# Patient Record
Sex: MALE | Race: OTHER | HISPANIC OR LATINO | URBAN - METROPOLITAN AREA
[De-identification: names, ages, dates, MRNs, and addresses within clinical notes are randomized per-mention and may not be internally consistent; named-entity substitution may affect disease eponyms.]

---

## 2018-06-22 ENCOUNTER — EMERGENCY (EMERGENCY)
Facility: HOSPITAL | Age: 48
LOS: 1 days | Discharge: OTHER INSTITUTION | End: 2018-06-22
Attending: EMERGENCY MEDICINE
Payer: SELF-PAY

## 2018-06-22 VITALS
DIASTOLIC BLOOD PRESSURE: 94 MMHG | OXYGEN SATURATION: 99 % | TEMPERATURE: 99 F | WEIGHT: 175.05 LBS | HEART RATE: 89 BPM | SYSTOLIC BLOOD PRESSURE: 171 MMHG | RESPIRATION RATE: 16 BRPM

## 2018-06-22 VITALS
DIASTOLIC BLOOD PRESSURE: 90 MMHG | HEART RATE: 97 BPM | RESPIRATION RATE: 18 BRPM | OXYGEN SATURATION: 98 % | SYSTOLIC BLOOD PRESSURE: 146 MMHG | TEMPERATURE: 98 F

## 2018-06-22 DIAGNOSIS — Z98.89 OTHER SPECIFIED POSTPROCEDURAL STATES: Chronic | ICD-10-CM

## 2018-06-22 DIAGNOSIS — F11.10 OPIOID ABUSE, UNCOMPLICATED: ICD-10-CM

## 2018-06-22 DIAGNOSIS — F14.10 COCAINE ABUSE, UNCOMPLICATED: ICD-10-CM

## 2018-06-22 DIAGNOSIS — F32.9 MAJOR DEPRESSIVE DISORDER, SINGLE EPISODE, UNSPECIFIED: ICD-10-CM

## 2018-06-22 DIAGNOSIS — F10.10 ALCOHOL ABUSE, UNCOMPLICATED: ICD-10-CM

## 2018-06-22 DIAGNOSIS — R69 ILLNESS, UNSPECIFIED: ICD-10-CM

## 2018-06-22 LAB
AMPHET UR-MCNC: NEGATIVE — SIGNIFICANT CHANGE UP
ANION GAP SERPL CALC-SCNC: 19 MMOL/L — HIGH (ref 5–17)
APAP SERPL-MCNC: <7.5 UG/ML — LOW (ref 10–26)
APPEARANCE UR: CLEAR — SIGNIFICANT CHANGE UP
BARBITURATES UR SCN-MCNC: NEGATIVE — SIGNIFICANT CHANGE UP
BASOPHILS # BLD AUTO: 0 K/UL — SIGNIFICANT CHANGE UP (ref 0–0.2)
BASOPHILS NFR BLD AUTO: 0.7 % — SIGNIFICANT CHANGE UP (ref 0–2)
BENZODIAZ UR-MCNC: NEGATIVE — SIGNIFICANT CHANGE UP
BILIRUB UR-MCNC: NEGATIVE — SIGNIFICANT CHANGE UP
BUN SERPL-MCNC: 7 MG/DL — LOW (ref 8–20)
CALCIUM SERPL-MCNC: 9.6 MG/DL — SIGNIFICANT CHANGE UP (ref 8.6–10.2)
CHLORIDE SERPL-SCNC: 100 MMOL/L — SIGNIFICANT CHANGE UP (ref 98–107)
CO2 SERPL-SCNC: 18 MMOL/L — LOW (ref 22–29)
COCAINE METAB.OTHER UR-MCNC: NEGATIVE — SIGNIFICANT CHANGE UP
COLOR SPEC: YELLOW — SIGNIFICANT CHANGE UP
CREAT SERPL-MCNC: 0.66 MG/DL — SIGNIFICANT CHANGE UP (ref 0.5–1.3)
DIFF PNL FLD: ABNORMAL
EOSINOPHIL # BLD AUTO: 0.1 K/UL — SIGNIFICANT CHANGE UP (ref 0–0.5)
EOSINOPHIL NFR BLD AUTO: 0.8 % — SIGNIFICANT CHANGE UP (ref 0–5)
ETHANOL SERPL-MCNC: 51 MG/DL — SIGNIFICANT CHANGE UP
GLUCOSE SERPL-MCNC: 103 MG/DL — SIGNIFICANT CHANGE UP (ref 70–115)
GLUCOSE UR QL: NEGATIVE MG/DL — SIGNIFICANT CHANGE UP
HCT VFR BLD CALC: 45.4 % — SIGNIFICANT CHANGE UP (ref 42–52)
HGB BLD-MCNC: 15.6 G/DL — SIGNIFICANT CHANGE UP (ref 14–18)
KETONES UR-MCNC: NEGATIVE — SIGNIFICANT CHANGE UP
LEUKOCYTE ESTERASE UR-ACNC: ABNORMAL
LYMPHOCYTES # BLD AUTO: 1.7 K/UL — SIGNIFICANT CHANGE UP (ref 1–4.8)
LYMPHOCYTES # BLD AUTO: 22.4 % — SIGNIFICANT CHANGE UP (ref 20–55)
MCHC RBC-ENTMCNC: 30.9 PG — SIGNIFICANT CHANGE UP (ref 27–31)
MCHC RBC-ENTMCNC: 34.4 G/DL — SIGNIFICANT CHANGE UP (ref 32–36)
MCV RBC AUTO: 89.9 FL — SIGNIFICANT CHANGE UP (ref 80–94)
METHADONE UR-MCNC: NEGATIVE — SIGNIFICANT CHANGE UP
MONOCYTES # BLD AUTO: 0.5 K/UL — SIGNIFICANT CHANGE UP (ref 0–0.8)
MONOCYTES NFR BLD AUTO: 6.4 % — SIGNIFICANT CHANGE UP (ref 3–10)
NEUTROPHILS # BLD AUTO: 5.3 K/UL — SIGNIFICANT CHANGE UP (ref 1.8–8)
NEUTROPHILS NFR BLD AUTO: 69.3 % — SIGNIFICANT CHANGE UP (ref 37–73)
NITRITE UR-MCNC: NEGATIVE — SIGNIFICANT CHANGE UP
OPIATES UR-MCNC: NEGATIVE — SIGNIFICANT CHANGE UP
PCP SPEC-MCNC: SIGNIFICANT CHANGE UP
PCP UR-MCNC: NEGATIVE — SIGNIFICANT CHANGE UP
PH UR: 6.5 — SIGNIFICANT CHANGE UP (ref 5–8)
PLATELET # BLD AUTO: 208 K/UL — SIGNIFICANT CHANGE UP (ref 150–400)
POTASSIUM SERPL-MCNC: 4 MMOL/L — SIGNIFICANT CHANGE UP (ref 3.5–5.3)
POTASSIUM SERPL-SCNC: 4 MMOL/L — SIGNIFICANT CHANGE UP (ref 3.5–5.3)
PROT UR-MCNC: NEGATIVE MG/DL — SIGNIFICANT CHANGE UP
RBC # BLD: 5.05 M/UL — SIGNIFICANT CHANGE UP (ref 4.6–6.2)
RBC # FLD: 12.6 % — SIGNIFICANT CHANGE UP (ref 11–15.6)
SALICYLATES SERPL-MCNC: <0.6 MG/DL — LOW (ref 10–20)
SODIUM SERPL-SCNC: 137 MMOL/L — SIGNIFICANT CHANGE UP (ref 135–145)
SP GR SPEC: 1 — LOW (ref 1.01–1.02)
THC UR QL: POSITIVE
UROBILINOGEN FLD QL: NEGATIVE MG/DL — SIGNIFICANT CHANGE UP
WBC # BLD: 7.7 K/UL — SIGNIFICANT CHANGE UP (ref 4.8–10.8)
WBC # FLD AUTO: 7.7 K/UL — SIGNIFICANT CHANGE UP (ref 4.8–10.8)

## 2018-06-22 PROCEDURE — 80307 DRUG TEST PRSMV CHEM ANLYZR: CPT

## 2018-06-22 PROCEDURE — 99284 EMERGENCY DEPT VISIT MOD MDM: CPT

## 2018-06-22 PROCEDURE — 80048 BASIC METABOLIC PNL TOTAL CA: CPT

## 2018-06-22 PROCEDURE — 36415 COLL VENOUS BLD VENIPUNCTURE: CPT

## 2018-06-22 PROCEDURE — 93005 ELECTROCARDIOGRAM TRACING: CPT

## 2018-06-22 PROCEDURE — 99285 EMERGENCY DEPT VISIT HI MDM: CPT

## 2018-06-22 PROCEDURE — 93010 ELECTROCARDIOGRAM REPORT: CPT

## 2018-06-22 PROCEDURE — 81001 URINALYSIS AUTO W/SCOPE: CPT

## 2018-06-22 PROCEDURE — 85027 COMPLETE CBC AUTOMATED: CPT

## 2018-06-22 RX ORDER — DOLUTEGRAVIR SODIUM 25 MG/1
1 TABLET, FILM COATED ORAL
Qty: 0 | Refills: 0 | COMMUNITY

## 2018-06-22 RX ORDER — EMTRICITABINE AND TENOFOVIR DISOPROXIL FUMARATE 200; 300 MG/1; MG/1
1 TABLET, FILM COATED ORAL
Qty: 0 | Refills: 0 | COMMUNITY

## 2018-06-22 RX ORDER — THIAMINE MONONITRATE (VIT B1) 100 MG
100 TABLET ORAL ONCE
Qty: 0 | Refills: 0 | Status: COMPLETED | OUTPATIENT
Start: 2018-06-22 | End: 2018-06-22

## 2018-06-22 RX ORDER — ESCITALOPRAM OXALATE 10 MG/1
1 TABLET, FILM COATED ORAL
Qty: 0 | Refills: 0 | COMMUNITY

## 2018-06-22 RX ORDER — MULTIVIT-MIN/FERROUS GLUCONATE 9 MG/15 ML
1 LIQUID (ML) ORAL
Qty: 0 | Refills: 0 | COMMUNITY

## 2018-06-22 RX ORDER — DARUNAVIR ETHANOLATE AND COBICISTAT 800; 150 MG/1; MG/1
1 TABLET, FILM COATED ORAL
Qty: 0 | Refills: 0 | COMMUNITY

## 2018-06-22 RX ORDER — BUPRENORPHINE AND NALOXONE 2; .5 MG/1; MG/1
0 TABLET SUBLINGUAL
Qty: 0 | Refills: 0 | COMMUNITY

## 2018-06-22 RX ADMIN — Medication 2 MILLIGRAM(S): at 09:23

## 2018-06-22 RX ADMIN — Medication 100 MILLIGRAM(S): at 12:53

## 2018-06-22 RX ADMIN — Medication 1 MILLIGRAM(S): at 12:33

## 2018-06-22 NOTE — ED PROVIDER NOTE - PMH
Alcohol abuse    Heroin abuse    Knee pain, right    Lumbar pain  Pt states that he is having an AXEL on 7/30  Nose pain    Rotator cuff tear, right

## 2018-06-22 NOTE — ED BEHAVIORAL HEALTH NOTE - BEHAVIORAL HEALTH NOTE
SW Note: SBIRT met with pt and a referral to [facility] was made for detox tx. Dr. Siddiqui completed a peer to peer review with RAMONE DAMICO, Dr. Muller. Pt accepted for admission.  RN Wood Martínez called and completed RN hand off. Ambulance being arranged.

## 2018-06-22 NOTE — ED PROVIDER NOTE - OBJECTIVE STATEMENT
47 y/o M with PMHx of EtOH abuse and heroin abuse presents to ED c/o depression and insomnia onset today. Patient states he recently stopped using heroin, was supposed to be taking Suboxone but has not follow-up with PCP to get it. Notes he has been self-medicating with alcohol, last drink this am (1 beer), and attempted to use a Narcan kit he had at home to see if that would improve his symptoms. Denies any recent drug use except for marijuana, abdominal pain, nausea, vomiting, SI or HI. No further acute complaints at this time.

## 2018-06-22 NOTE — ED ADULT NURSE NOTE - CHPI ED SYMPTOMS NEG
no agitation/no confusion/no homicidal/no change in level of consciousness/no hallucinations/no weakness/no weight loss/no paranoia/no disorientation

## 2018-06-22 NOTE — ED ADULT TRIAGE NOTE - CHIEF COMPLAINT QUOTE
Pt BIBA ambulatory in ED c/o alcohol intoxication, reports he drinks a lot because he feels depressed. Pt denies any SI or HI. Reports hx of opiate abuse. Denies any drug use today but states " I took Narcan today, I don't know what I was thinking."

## 2018-06-22 NOTE — ED BEHAVIORAL HEALTH ASSESSMENT NOTE - DESCRIPTION
uneventful   ICU Vital Signs Last 24 Hrs  T(C): 37 (22 Jun 2018 08:55), Max: 37 (22 Jun 2018 08:55)  T(F): 98.6 (22 Jun 2018 08:55), Max: 98.6 (22 Jun 2018 08:55)  HR: 89 (22 Jun 2018 08:55) (89 - 89)  BP: 171/94 (22 Jun 2018 08:55) (171/94 - 171/94)  BP(mean): --  ABP: --  ABP(mean): --  RR: 16 (22 Jun 2018 08:55) (16 - 16)  SpO2: 99% (22 Jun 2018 08:55) (99% - 99%) HIV infection patient lives with sister, unemployed uneventful   ICU Vital Signs Last 24 Hrs  T(C): 37 (22 Jun 2018 08:55), Max: 37 (22 Jun 2018 08:55)  T(F): 98.6 (22 Jun 2018 08:55), Max: 98.6 (22 Jun 2018 08:55)  HR: 89 (22 Jun 2018 08:55) (89 - 89)  BP: 171/94 (22 Jun 2018 08:55) (171/94 - 171/94)  RR: 16 (22 Jun 2018 08:55) (16 - 16)  SpO2: 99% (22 Jun 2018 08:55) (99% - 99%)

## 2018-06-22 NOTE — SBIRT NOTE. - NSSBIRTSERVICES_GEN_A_ED
Referral to Treatment Provided/Brief Intervention Performed/Full Screen ONLY/Smoking Cessation Information Provided

## 2018-06-22 NOTE — ED ADULT NURSE REASSESSMENT NOTE - NS ED NURSE REASSESS COMMENT FT1
Nurse to nurse report called and given to Kristie Rn at [facility].  Patient stable, returned to bed after eating 100% of his lunch.  Safety of patient maintained.
Patient was noted to have tremors in extremities and complaints of anxiety,  CIWA score was 8 at 1234.   Dr. Siddiqui notified and patient received Ativan 1mg PO.  Patient out of room for lunch and is currently eating.  No attempts to harm self or others and safety maintained.

## 2018-06-22 NOTE — SBIRT NOTE. - NSSBIRTSERVICES_GEN_A_ED_FT
Provided SBIRT services: Full screen positive. Referral to Treatment Performed. Screening results were reviewed with the patient and patient was provided information about healthy guidelines and potential negative consequences associated with level of risk. Motivation and readiness to reduce or stop use was discussed and goals and activities to make changes were suggested/offered.  Referral for complete assessment and level of care determination at a certified treatment facility was completed by contacting the treatment facility via phone, and add apt info as noted below:  Appt confirmed at [facility] - Dr. Conway to Dr. Muller - Transfer via [facility] - Nurse to nurse  [phone]  Audit Score: 32  DAST Score: 8  Duration = 20 Minutes

## 2018-06-22 NOTE — ED BEHAVIORAL HEALTH ASSESSMENT NOTE - SUMMARY
48  year old male with PMHx of EtOH abuse (12 beers a day), HIV infection on HAART, heroin abuse (last use was months ago) presents to ED c/o depression and insomnia which he had on and off for past couple of years whenever he stops drinking. The patient denies homicidal or suicidal ideation.  Patient has history of heroin use but not currently getting suboxone, and he is currently using alcohol but he is not a part of any abstinence program. Patient will need to be re-started on antidepressants. He was previously on esctialopram, unclear if he was compliant with his medications. 48  year old male with PMHx of EtOH abuse (12 beers a day), HIV infection on HAART, heroin abuse (last use was months ago) presents to ED c/o depression and insomnia which he had on and off for past couple of years whenever he stops drinking. The patient denies homicidal or suicidal ideation.  Patient has history of heroin use but not currently getting suboxone, and he is currently using alcohol but he is not a part of any abstinence program. Patient will need to be re-started on antidepressants. He was previously on esctialopram, unclear if he was compliant with his medications. Patient got 2 mg of ativan PO in the ED. Patient denies symptoms of alcohol withdrawal currently, except for anxiety, however he says that he had insomnia for "a while" and it's not acute.

## 2018-06-22 NOTE — ED ADULT NURSE NOTE - OBJECTIVE STATEMENT
Patient presented in  area after being medically cleared in main ED.  Patient dressed in yellow gowns, ambulating with steady gait and speech is clear with a low tone.  Patient endorses he has been anxious and depressed.  Patient admits to daily alcohol abuse approximately 10 beers a day which he has to drink to sleep.  Patient has a history of heroin abuse and was on Suboxone but has run out of it due to missing last three appointment with Dr. Humphrey.  Patient smokes marijuana daily for the past 30 years and 4 to 5 cigarettes a day.  No history of rehab, detox, or inpatient psychiatric hospitalizations.  Patient is reports he takes three medications for his HIV but unable to recall names of medication.  Medical history also includes being treated for a collapsed lung last year and a stroke in 2014 after which he lost hearing in his right ear.  Patient feels depressed and hopeless with passive suicidal ideations but denies plan or intent to harm himself related to his daily drinking.  Patient is currently living with his sister and is unemployed.  Denies psychotic symptoms but reports he does get nervous around people at times. Patient presented in  area after being medically cleared in main ED.  Patient dressed in yellow gowns, ambulating with steady gait and speech is clear with a low tone.  Patient endorses he has been anxious and depressed.  Patient admits to daily alcohol abuse approximately 10 beers a day which he has to drink to sleep.  Patient has a history of heroin abuse and was on Suboxone but has run out of it due to missing last three appointment with Dr. uHmphrey.  Patient smokes marijuana daily for the past 30 years and 4 to 5 cigarettes a day.  No history of rehab, detox, or inpatient psychiatric hospitalizations.  Patient is reports he takes three medications for his HIV but unable to recall names of medication.  Medical history also includes being treated for a collapsed lung last year and a stroke in 2014 after which he lost hearing in his right ear.  Patient feels depressed and hopeless with passive suicidal ideations but denies plan or intent to harm himself related to his daily drinking.  Patient is currently living with his sister and is unemployed.  Denies psychotic symptoms but reports he does get nervous around people at times.  Patient reports he self administered Narcan from his kit today in hopes it would help him sleep.

## 2018-06-22 NOTE — ED ADULT NURSE NOTE - CAS DISCH CONDITION
Stable/Pt transferred to [facility] for continued detox.  Alert and oriented.  Reports feeling better. Used restroom prior to leaving. Gait steady. No S/I , No H/I and no psychosis observed. Reports feeling tired. All belonging given to  Ambulance team. Left in stable condition

## 2018-06-22 NOTE — ED BEHAVIORAL HEALTH ASSESSMENT NOTE - DESCRIPTION (FIRST USE, LAST USE, QUANTITY, FREQUENCY, DURATION)
patient has been drinking alcohol for years, and his last drink was this morning (one beer) last weekend heroin use a "couple of months ago"

## 2018-06-22 NOTE — ED BEHAVIORAL HEALTH ASSESSMENT NOTE - CURRENT MEDICATION
escitalopram 10 mg oral tablet: 1 tab(s) orally once a day (22 Jun 2018 11:22)  Prezcobix 800 mg-150 mg oral tablet: 1 tab(s) orally once a day (22 Jun 2018 11:22)  Suboxone 8 mg-2 mg sublingual tablet: sublingual 3 times a day (22 Jun 2018 11:22)  Tivicay 50 mg oral tablet: 1 tab(s) orally once a day (22 Jun 2018 11:22)  Truvada 200 mg-300 mg oral tablet: 1 tab(s) orally once a day (22 Jun 2018 11:22)

## 2018-06-22 NOTE — ED BEHAVIORAL HEALTH ASSESSMENT NOTE - HPI (INCLUDE ILLNESS QUALITY, SEVERITY, DURATION, TIMING, CONTEXT, MODIFYING FACTORS, ASSOCIATED SIGNS AND SYMPTOMS)
48  year old male with PMHx of EtOH abuse (12 beers a day), HIV infection on HAART, heroin abuse (last use was months ago) presents to ED c/o depression and insomnia which he had on and off for the past "couple of years".  The patient states that he gets sad, lacks motivation, cries easily and is unable to sleep whenever he quits drinking. Patient states he recently stopped using heroin a "couple of months ago" , was supposed to be taking Suboxone but has not follow-up with PCP to get it. Notes he has been self-medicating with alcohol, last drink this am (1 beer), and attempted to use a Narcan kit he had at home to see if that would improve his symptoms. Patient states that he used cocaine last weekned. Patient denies symptoms of alcohol withdrawal such as headache, sweating, tremor, and says he has never had a withdrawal seizure, and states he has never been in the hospital for alcohol withdrawal. The patient has never been part of an inpatient or outpatient alcohol or heroin abstinence program.

## 2020-03-29 NOTE — ED BEHAVIORAL HEALTH ASSESSMENT NOTE - ACTIVATING EVENTS/STRESSORS
Answered call bell, pt requesting to have something to drink.  MD ok with this.  Pt updated on plan for repeat troponins.   Non-compliant with treatment

## 2022-03-28 ENCOUNTER — EMERGENCY (EMERGENCY)
Facility: HOSPITAL | Age: 52
LOS: 1 days | Discharge: HOME | End: 2022-03-28
Attending: EMERGENCY MEDICINE
Payer: COMMERCIAL

## 2022-03-28 VITALS
OXYGEN SATURATION: 98 % | TEMPERATURE: 99 F | HEART RATE: 71 BPM | WEIGHT: 154.98 LBS | RESPIRATION RATE: 20 BRPM | DIASTOLIC BLOOD PRESSURE: 100 MMHG | HEIGHT: 70 IN | SYSTOLIC BLOOD PRESSURE: 162 MMHG

## 2022-03-28 VITALS
HEART RATE: 72 BPM | TEMPERATURE: 98 F | DIASTOLIC BLOOD PRESSURE: 88 MMHG | SYSTOLIC BLOOD PRESSURE: 141 MMHG | OXYGEN SATURATION: 98 % | RESPIRATION RATE: 18 BRPM

## 2022-03-28 DIAGNOSIS — Z98.89 OTHER SPECIFIED POSTPROCEDURAL STATES: Chronic | ICD-10-CM

## 2022-03-28 PROBLEM — F11.10 OPIOID ABUSE, UNCOMPLICATED: Chronic | Status: ACTIVE | Noted: 2018-06-22

## 2022-03-28 PROBLEM — F10.10 ALCOHOL ABUSE, UNCOMPLICATED: Chronic | Status: ACTIVE | Noted: 2018-06-22

## 2022-03-28 PROCEDURE — 99284 EMERGENCY DEPT VISIT MOD MDM: CPT

## 2022-03-28 PROCEDURE — 96375 TX/PRO/DX INJ NEW DRUG ADDON: CPT

## 2022-03-28 PROCEDURE — 99283 EMERGENCY DEPT VISIT LOW MDM: CPT

## 2022-03-28 PROCEDURE — 96374 THER/PROPH/DIAG INJ IV PUSH: CPT

## 2022-03-28 RX ORDER — KETOROLAC TROMETHAMINE 30 MG/ML
30 SYRINGE (ML) INJECTION ONCE
Refills: 0 | Status: DISCONTINUED | OUTPATIENT
Start: 2022-03-28 | End: 2022-03-28

## 2022-03-28 RX ORDER — MIDAZOLAM HYDROCHLORIDE 1 MG/ML
5 INJECTION, SOLUTION INTRAMUSCULAR; INTRAVENOUS ONCE
Refills: 0 | Status: DISCONTINUED | OUTPATIENT
Start: 2022-03-28 | End: 2022-03-28

## 2022-03-28 RX ADMIN — Medication 30 MILLIGRAM(S): at 15:11

## 2022-03-28 RX ADMIN — MIDAZOLAM HYDROCHLORIDE 5 MILLIGRAM(S): 1 INJECTION, SOLUTION INTRAMUSCULAR; INTRAVENOUS at 15:15

## 2022-03-28 NOTE — ED PROVIDER NOTE - OBJECTIVE STATEMENT
52 year old male with PMH opiate abuse on Suboxone presents with opiate abuse. Pt states that he used 5 bags of heroin today after using his suboxone and he feels nausea and muscle aches. no chest pain, SOB, abd pain, fevers, chills.

## 2022-03-28 NOTE — ED PROVIDER NOTE - NSICDXPASTMEDICALHX_GEN_ALL_CORE_FT
PAST MEDICAL HISTORY:  Alcohol abuse     Heroin abuse     Knee pain, right     Lumbar pain Pt states that he is having an AXEL on 7/30    Nose pain     Rotator cuff tear, right

## 2022-03-28 NOTE — ED PROVIDER NOTE - CLINICAL SUMMARY MEDICAL DECISION MAKING FREE TEXT BOX
Pt is awake, alert, lucid and coherent. Ambulating with steady gait, no complaints, no sign of trauma. No clinical sign of intox or withdrawal. Stable for dc. Alreaydbin tx and declingin addiitonal services

## 2022-03-28 NOTE — ED ADULT TRIAGE NOTE - CHIEF COMPLAINT QUOTE
as per EMS pt took his Suboxone and then decided to sniff heroin 5x. pt undressed and placed in yellow gown. pt AOX3 co not feeling well.

## 2022-03-28 NOTE — ED PROVIDER NOTE - PATIENT PORTAL LINK FT
You can access the FollowMyHealth Patient Portal offered by [facility] by registering at the following website: [email or web address]. By joining [employer]’s FollowMyHealth portal, you will also be able to view your health information using other applications (apps) compatible with our system.

## 2022-03-28 NOTE — ED ADULT NURSE NOTE - DOES PATIENT HAVE ADVANCE DIRECTIVE
10/29/19 0700   Team Meeting   Meeting Type Daily Rounds   Team Members Present   Team Members Present Physician;Nurse;;; Other (Discipline and Name)   Physician Team Member [address] Team Member [address] [place] [employer] Team Member [place]   Social Work Team Member jerica Sellers   Other (Discipline and Name) Therapist- Elle Booker; Medical student- Julio   Patient/Family Present   Patient Present No   Patient's Family Present No     D/C today at noon- boss is picking up No

## 2022-03-28 NOTE — ED ADULT NURSE NOTE - OBJECTIVE STATEMENT
pt to ED after ingestion of heroin this AM. pt loud and agitated. lying in stretcher. pt disoriented. jumping in and out of bed, yelling for doctor. pt to ED after ingestion of heroin this AM. pt loud and agitated. lying in stretcher. pt disoriented. jumping in and out of bed, yelling for doctor.  PT maintaining airway. No distress noted at this time. Medicated per orders. Pending discharge

## 2022-12-12 NOTE — ED BEHAVIORAL HEALTH ASSESSMENT NOTE - PATIENT SEEN BY
Patient a/ox3. Tolerating diet. Ambulated in room steady on feet. Vss.  IV dcd.  DC instructions went over with patient in detail including f/u appts, f/u lab, when to seek medical attention, dc med list.  Patient had home oxygen with him .he left on 2L nc . I escorted him via ambulance w/ all belongings. , wife picked up .   Attending Psychiatrist supervising NP/Trainee and meeting pt

## 2023-01-01 ENCOUNTER — INPATIENT (INPATIENT)
Facility: HOSPITAL | Age: 53
LOS: 5 days | DRG: 974 | End: 2023-05-07
Attending: HOSPITALIST | Admitting: INTERNAL MEDICINE
Payer: COMMERCIAL

## 2023-01-01 VITALS — OXYGEN SATURATION: 91 % | HEART RATE: 97 BPM | RESPIRATION RATE: 24 BRPM | TEMPERATURE: 96 F

## 2023-01-01 VITALS
SYSTOLIC BLOOD PRESSURE: 128 MMHG | TEMPERATURE: 99 F | WEIGHT: 145.06 LBS | OXYGEN SATURATION: 91 % | RESPIRATION RATE: 20 BRPM | DIASTOLIC BLOOD PRESSURE: 73 MMHG | HEART RATE: 111 BPM

## 2023-01-01 DIAGNOSIS — A41.9 SEPSIS, UNSPECIFIED ORGANISM: ICD-10-CM

## 2023-01-01 DIAGNOSIS — E80.6 OTHER DISORDERS OF BILIRUBIN METABOLISM: ICD-10-CM

## 2023-01-01 DIAGNOSIS — K92.2 GASTROINTESTINAL HEMORRHAGE, UNSPECIFIED: ICD-10-CM

## 2023-01-01 DIAGNOSIS — J96.01 ACUTE RESPIRATORY FAILURE WITH HYPOXIA: ICD-10-CM

## 2023-01-01 DIAGNOSIS — D61.818 OTHER PANCYTOPENIA: ICD-10-CM

## 2023-01-01 DIAGNOSIS — B20 HUMAN IMMUNODEFICIENCY VIRUS [HIV] DISEASE: ICD-10-CM

## 2023-01-01 DIAGNOSIS — Z98.89 OTHER SPECIFIED POSTPROCEDURAL STATES: Chronic | ICD-10-CM

## 2023-01-01 DIAGNOSIS — K52.9 NONINFECTIVE GASTROENTERITIS AND COLITIS, UNSPECIFIED: ICD-10-CM

## 2023-01-01 DIAGNOSIS — E83.51 HYPOCALCEMIA: ICD-10-CM

## 2023-01-01 LAB
ABO RH CONFIRMATION: SIGNIFICANT CHANGE UP
ALBUMIN SERPL ELPH-MCNC: 1.8 G/DL — LOW (ref 3.3–5.2)
ALBUMIN SERPL ELPH-MCNC: 1.9 G/DL — LOW (ref 3.3–5.2)
ALBUMIN SERPL ELPH-MCNC: 2 G/DL — LOW (ref 3.3–5.2)
ALBUMIN SERPL ELPH-MCNC: 2.1 G/DL — LOW (ref 3.3–5.2)
ALBUMIN SERPL ELPH-MCNC: 2.1 G/DL — LOW (ref 3.3–5.2)
ALBUMIN SERPL ELPH-MCNC: 2.2 G/DL — LOW (ref 3.3–5.2)
ALP SERPL-CCNC: 418 U/L — HIGH (ref 40–120)
ALP SERPL-CCNC: 420 U/L — HIGH (ref 40–120)
ALP SERPL-CCNC: 451 U/L — HIGH (ref 40–120)
ALP SERPL-CCNC: 486 U/L — HIGH (ref 40–120)
ALP SERPL-CCNC: 571 U/L — HIGH (ref 40–120)
ALP SERPL-CCNC: 608 U/L — HIGH (ref 40–120)
ALT FLD-CCNC: 61 U/L — HIGH
ALT FLD-CCNC: 68 U/L — HIGH
ALT FLD-CCNC: 68 U/L — HIGH
ALT FLD-CCNC: 69 U/L — HIGH
ALT FLD-CCNC: 73 U/L — HIGH
ALT FLD-CCNC: 80 U/L — HIGH
AMMONIA BLD-MCNC: 18 UMOL/L — SIGNIFICANT CHANGE UP (ref 11–55)
AMPHET UR-MCNC: NEGATIVE — SIGNIFICANT CHANGE UP
ANION GAP SERPL CALC-SCNC: 12 MMOL/L — SIGNIFICANT CHANGE UP (ref 5–17)
ANION GAP SERPL CALC-SCNC: 12 MMOL/L — SIGNIFICANT CHANGE UP (ref 5–17)
ANION GAP SERPL CALC-SCNC: 13 MMOL/L — SIGNIFICANT CHANGE UP (ref 5–17)
ANION GAP SERPL CALC-SCNC: 14 MMOL/L — SIGNIFICANT CHANGE UP (ref 5–17)
ANION GAP SERPL CALC-SCNC: 14 MMOL/L — SIGNIFICANT CHANGE UP (ref 5–17)
ANION GAP SERPL CALC-SCNC: 15 MMOL/L — SIGNIFICANT CHANGE UP (ref 5–17)
ANION GAP SERPL CALC-SCNC: 15 MMOL/L — SIGNIFICANT CHANGE UP (ref 5–17)
ANISOCYTOSIS BLD QL: SLIGHT — SIGNIFICANT CHANGE UP
APPEARANCE UR: CLEAR — SIGNIFICANT CHANGE UP
APTT BLD: 42.2 SEC — HIGH (ref 27.5–35.5)
APTT BLD: 44.7 SEC — HIGH (ref 27.5–35.5)
APTT BLD: 46.4 SEC — HIGH (ref 27.5–35.5)
APTT BLD: 48.5 SEC — HIGH (ref 27.5–35.5)
APTT BLD: 49.1 SEC — HIGH (ref 27.5–35.5)
AST SERPL-CCNC: 104 U/L — HIGH
AST SERPL-CCNC: 107 U/L — HIGH
AST SERPL-CCNC: 125 U/L — HIGH
AST SERPL-CCNC: 130 U/L — HIGH
AST SERPL-CCNC: 138 U/L — HIGH
AST SERPL-CCNC: 91 U/L — HIGH
B19V DNA FLD QL NAA+PROBE: SIGNIFICANT CHANGE UP IU/ML
BACTERIA # UR AUTO: ABNORMAL
BARBITURATES UR SCN-MCNC: NEGATIVE — SIGNIFICANT CHANGE UP
BASE EXCESS BLDA CALC-SCNC: -8.1 MMOL/L — LOW (ref -2–3)
BASOPHILS # BLD AUTO: 0 K/UL — SIGNIFICANT CHANGE UP (ref 0–0.2)
BASOPHILS # BLD AUTO: 0.01 K/UL — SIGNIFICANT CHANGE UP (ref 0–0.2)
BASOPHILS NFR BLD AUTO: 0 % — SIGNIFICANT CHANGE UP (ref 0–2)
BASOPHILS NFR BLD AUTO: 0.4 % — SIGNIFICANT CHANGE UP (ref 0–2)
BASOPHILS NFR BLD AUTO: 1 % — SIGNIFICANT CHANGE UP (ref 0–2)
BENZODIAZ UR-MCNC: NEGATIVE — SIGNIFICANT CHANGE UP
BILIRUB DIRECT SERPL-MCNC: >10 MG/DL — HIGH (ref 0–0.3)
BILIRUB INDIRECT FLD-MCNC: SIGNIFICANT CHANGE UP MG/DL (ref 0.2–1)
BILIRUB SERPL-MCNC: 17.9 MG/DL — HIGH (ref 0.4–2)
BILIRUB SERPL-MCNC: 19.6 MG/DL — HIGH (ref 0.4–2)
BILIRUB SERPL-MCNC: 20.5 MG/DL — HIGH (ref 0.4–2)
BILIRUB SERPL-MCNC: 21.5 MG/DL — HIGH (ref 0.4–2)
BILIRUB SERPL-MCNC: 21.7 MG/DL — HIGH (ref 0.4–2)
BILIRUB SERPL-MCNC: 22.9 MG/DL — HIGH (ref 0.4–2)
BILIRUB SERPL-MCNC: 24 MG/DL — HIGH (ref 0.4–2)
BILIRUB UR-MCNC: ABNORMAL
BLD GP AB SCN SERPL QL: SIGNIFICANT CHANGE UP
BLOOD GAS COMMENTS ARTERIAL: SIGNIFICANT CHANGE UP
BUN SERPL-MCNC: 43.6 MG/DL — HIGH (ref 8–20)
BUN SERPL-MCNC: 48.1 MG/DL — HIGH (ref 8–20)
BUN SERPL-MCNC: 53.9 MG/DL — HIGH (ref 8–20)
BUN SERPL-MCNC: 55.4 MG/DL — HIGH (ref 8–20)
BUN SERPL-MCNC: 55.9 MG/DL — HIGH (ref 8–20)
BUN SERPL-MCNC: 56.1 MG/DL — HIGH (ref 8–20)
BUN SERPL-MCNC: 61.2 MG/DL — HIGH (ref 8–20)
BURR CELLS BLD QL SMEAR: PRESENT — SIGNIFICANT CHANGE UP
CALCIUM SERPL-MCNC: 6.8 MG/DL — LOW (ref 8.4–10.5)
CALCIUM SERPL-MCNC: 7 MG/DL — LOW (ref 8.4–10.5)
CALCIUM SERPL-MCNC: 7.1 MG/DL — LOW (ref 8.4–10.5)
CALCIUM SERPL-MCNC: 7.7 MG/DL — LOW (ref 8.4–10.5)
CALCIUM SERPL-MCNC: 8 MG/DL — LOW (ref 8.4–10.5)
CHLORIDE SERPL-SCNC: 100 MMOL/L — SIGNIFICANT CHANGE UP (ref 96–108)
CHLORIDE SERPL-SCNC: 105 MMOL/L — SIGNIFICANT CHANGE UP (ref 96–108)
CHLORIDE SERPL-SCNC: 108 MMOL/L — SIGNIFICANT CHANGE UP (ref 96–108)
CHLORIDE SERPL-SCNC: 108 MMOL/L — SIGNIFICANT CHANGE UP (ref 96–108)
CHLORIDE SERPL-SCNC: 109 MMOL/L — HIGH (ref 96–108)
CHLORIDE SERPL-SCNC: 96 MMOL/L — SIGNIFICANT CHANGE UP (ref 96–108)
CHLORIDE SERPL-SCNC: 99 MMOL/L — SIGNIFICANT CHANGE UP (ref 96–108)
CMV DNA CSF QL NAA+PROBE: SIGNIFICANT CHANGE UP IU/ML
CMV DNA SPEC NAA+PROBE-LOG#: SIGNIFICANT CHANGE UP LOG10IU/ML
CO2 SERPL-SCNC: 18 MMOL/L — LOW (ref 22–29)
CO2 SERPL-SCNC: 19 MMOL/L — LOW (ref 22–29)
CO2 SERPL-SCNC: 19 MMOL/L — LOW (ref 22–29)
CO2 SERPL-SCNC: 20 MMOL/L — LOW (ref 22–29)
CO2 SERPL-SCNC: 22 MMOL/L — SIGNIFICANT CHANGE UP (ref 22–29)
CO2 SERPL-SCNC: 23 MMOL/L — SIGNIFICANT CHANGE UP (ref 22–29)
CO2 SERPL-SCNC: 24 MMOL/L — SIGNIFICANT CHANGE UP (ref 22–29)
COCAINE METAB.OTHER UR-MCNC: NEGATIVE — SIGNIFICANT CHANGE UP
COLOR SPEC: YELLOW — SIGNIFICANT CHANGE UP
CREAT SERPL-MCNC: 0.97 MG/DL — SIGNIFICANT CHANGE UP (ref 0.5–1.3)
CREAT SERPL-MCNC: 1.01 MG/DL — SIGNIFICANT CHANGE UP (ref 0.5–1.3)
CREAT SERPL-MCNC: 1.08 MG/DL — SIGNIFICANT CHANGE UP (ref 0.5–1.3)
CREAT SERPL-MCNC: 1.08 MG/DL — SIGNIFICANT CHANGE UP (ref 0.5–1.3)
CREAT SERPL-MCNC: 1.12 MG/DL — SIGNIFICANT CHANGE UP (ref 0.5–1.3)
CREAT SERPL-MCNC: 1.14 MG/DL — SIGNIFICANT CHANGE UP (ref 0.5–1.3)
CREAT SERPL-MCNC: 1.26 MG/DL — SIGNIFICANT CHANGE UP (ref 0.5–1.3)
CRYPTOC AG FLD QL: NEGATIVE — SIGNIFICANT CHANGE UP
CULTURE RESULTS: SIGNIFICANT CHANGE UP
DIFF PNL FLD: ABNORMAL
EBV DNA SERPL NAA+PROBE-ACNC: 547 IU/ML — HIGH
EBVPCR LOG: 2.74 LOG10IU/ML — HIGH
EGFR: 69 ML/MIN/1.73M2 — SIGNIFICANT CHANGE UP
EGFR: 78 ML/MIN/1.73M2 — SIGNIFICANT CHANGE UP
EGFR: 80 ML/MIN/1.73M2 — SIGNIFICANT CHANGE UP
EGFR: 83 ML/MIN/1.73M2 — SIGNIFICANT CHANGE UP
EGFR: 83 ML/MIN/1.73M2 — SIGNIFICANT CHANGE UP
EGFR: 90 ML/MIN/1.73M2 — SIGNIFICANT CHANGE UP
EGFR: 95 ML/MIN/1.73M2 — SIGNIFICANT CHANGE UP
EOSINOPHIL # BLD AUTO: 0 K/UL — SIGNIFICANT CHANGE UP (ref 0–0.5)
EOSINOPHIL # BLD AUTO: 0 K/UL — SIGNIFICANT CHANGE UP (ref 0–0.5)
EOSINOPHIL # BLD AUTO: 0.01 K/UL — SIGNIFICANT CHANGE UP (ref 0–0.5)
EOSINOPHIL NFR BLD AUTO: 0 % — SIGNIFICANT CHANGE UP (ref 0–6)
EOSINOPHIL NFR BLD AUTO: 1.4 % — SIGNIFICANT CHANGE UP (ref 0–6)
EOSINOPHIL NFR BLD AUTO: 3 % — SIGNIFICANT CHANGE UP (ref 0–6)
EOSINOPHIL NFR BLD AUTO: 3 % — SIGNIFICANT CHANGE UP (ref 0–6)
EOSINOPHIL NFR BLD AUTO: 3.4 % — SIGNIFICANT CHANGE UP (ref 0–6)
EPI CELLS # UR: SIGNIFICANT CHANGE UP
FERRITIN SERPL-MCNC: HIGH NG/ML (ref 30–400)
FIBRINOGEN PPP-MCNC: 156 MG/DL — LOW (ref 200–450)
FIBRINOGEN PPP-MCNC: 173 MG/DL — LOW (ref 200–450)
FUNGITELL: 53 PG/ML — SIGNIFICANT CHANGE UP
GAS PNL BLDA: SIGNIFICANT CHANGE UP
GAS PNL BLDA: SIGNIFICANT CHANGE UP
GIANT PLATELETS BLD QL SMEAR: PRESENT — SIGNIFICANT CHANGE UP
GLUCOSE SERPL-MCNC: 100 MG/DL — HIGH (ref 70–99)
GLUCOSE SERPL-MCNC: 101 MG/DL — HIGH (ref 70–99)
GLUCOSE SERPL-MCNC: 102 MG/DL — HIGH (ref 70–99)
GLUCOSE SERPL-MCNC: 105 MG/DL — HIGH (ref 70–99)
GLUCOSE SERPL-MCNC: 121 MG/DL — HIGH (ref 70–99)
GLUCOSE SERPL-MCNC: 126 MG/DL — HIGH (ref 70–99)
GLUCOSE SERPL-MCNC: 99 MG/DL — SIGNIFICANT CHANGE UP (ref 70–99)
GLUCOSE UR QL: NEGATIVE MG/DL — SIGNIFICANT CHANGE UP
H CAPSUL AG SER IA-MCNC: SIGNIFICANT CHANGE UP
HAPTOGLOB SERPL-MCNC: 49 MG/DL — SIGNIFICANT CHANGE UP (ref 34–200)
HAPTOGLOB SERPL-MCNC: <20 MG/DL — LOW (ref 34–200)
HAPTOGLOB SERPL-MCNC: <20 MG/DL — LOW (ref 34–200)
HAV IGM SER-ACNC: SIGNIFICANT CHANGE UP
HBV CORE IGM SER-ACNC: SIGNIFICANT CHANGE UP
HBV SURFACE AG SER-ACNC: SIGNIFICANT CHANGE UP
HCO3 BLDA-SCNC: 18 MMOL/L — LOW (ref 21–28)
HCT VFR BLD CALC: 19.3 % — CRITICAL LOW (ref 39–50)
HCT VFR BLD CALC: 20.4 % — CRITICAL LOW (ref 39–50)
HCT VFR BLD CALC: 23 % — LOW (ref 39–50)
HCT VFR BLD CALC: 26.9 % — LOW (ref 39–50)
HCT VFR BLD CALC: 27.2 % — LOW (ref 39–50)
HCT VFR BLD CALC: 27.5 % — LOW (ref 39–50)
HCV AB S/CO SERPL IA: 0.09 S/CO — SIGNIFICANT CHANGE UP (ref 0–0.99)
HCV AB SERPL-IMP: SIGNIFICANT CHANGE UP
HGB BLD-MCNC: 6.2 G/DL — CRITICAL LOW (ref 13–17)
HGB BLD-MCNC: 6.7 G/DL — CRITICAL LOW (ref 13–17)
HGB BLD-MCNC: 7.7 G/DL — LOW (ref 13–17)
HGB BLD-MCNC: 9.1 G/DL — LOW (ref 13–17)
HIV 1 & 2 AB SERPL IA.RAPID: REACTIVE
HIV 1+2 AB+HIV1 P24 AG SERPL QL IA: REACTIVE
HIV-1 VIRAL LOAD RESULT: SIGNIFICANT CHANGE UP
HIV1 RNA # SERPL NAA+PROBE: SIGNIFICANT CHANGE UP COPIES/ML
HIV1 RNA SER-IMP: SIGNIFICANT CHANGE UP
HIV1 RNA SERPL NAA+PROBE-ACNC: SIGNIFICANT CHANGE UP
HIV1 RNA SERPL NAA+PROBE-LOG#: SIGNIFICANT CHANGE UP LG COP/ML
HIV1+2 AB SPEC QL: ABNORMAL
HIV1+2 AB SPEC QL: SIGNIFICANT CHANGE UP
HOROWITZ INDEX BLDA+IHG-RTO: 40 — SIGNIFICANT CHANGE UP
IL2 SERPL-MCNC: HIGH PG/ML (ref 175.3–858.2)
IMM GRANULOCYTES NFR BLD AUTO: 0 % — SIGNIFICANT CHANGE UP (ref 0–0.9)
INR BLD: 1.66 RATIO — HIGH (ref 0.88–1.16)
INR BLD: 1.77 RATIO — HIGH (ref 0.88–1.16)
INR BLD: 1.98 RATIO — HIGH (ref 0.88–1.16)
INR BLD: 3.11 RATIO — HIGH (ref 0.88–1.16)
INR BLD: 3.16 RATIO — HIGH (ref 0.88–1.16)
IRON SATN MFR SERPL: 116 UG/DL — SIGNIFICANT CHANGE UP (ref 59–158)
IRON SATN MFR SERPL: 88 % — HIGH (ref 16–55)
KETONES UR-MCNC: NEGATIVE — SIGNIFICANT CHANGE UP
LACTATE BLDV-MCNC: 1.4 MMOL/L — SIGNIFICANT CHANGE UP (ref 0.5–2)
LACTATE BLDV-MCNC: 1.9 MMOL/L — SIGNIFICANT CHANGE UP (ref 0.5–2)
LACTATE BLDV-MCNC: 3.2 MMOL/L — HIGH (ref 0.5–2)
LACTATE SERPL-SCNC: 1.8 MMOL/L — SIGNIFICANT CHANGE UP (ref 0.5–2)
LDH SERPL L TO P-CCNC: 395 U/L — HIGH (ref 98–192)
LDH SERPL L TO P-CCNC: 465 U/L — HIGH (ref 98–192)
LDH SERPL L TO P-CCNC: 567 U/L — HIGH (ref 98–192)
LDH SERPL L TO P-CCNC: 580 U/L — HIGH (ref 98–192)
LEUKOCYTE ESTERASE UR-ACNC: ABNORMAL
LIDOCAIN IGE QN: 24 U/L — SIGNIFICANT CHANGE UP (ref 22–51)
LYMPHOCYTES # BLD AUTO: 0.11 K/UL — LOW (ref 1–3.3)
LYMPHOCYTES # BLD AUTO: 0.16 K/UL — LOW (ref 1–3.3)
LYMPHOCYTES # BLD AUTO: 0.22 K/UL — LOW (ref 1–3.3)
LYMPHOCYTES # BLD AUTO: 0.22 K/UL — LOW (ref 1–3.3)
LYMPHOCYTES # BLD AUTO: 0.6 K/UL — LOW (ref 1–3.3)
LYMPHOCYTES # BLD AUTO: 52 % — HIGH (ref 13–44)
LYMPHOCYTES # BLD AUTO: 59 % — HIGH (ref 13–44)
LYMPHOCYTES # BLD AUTO: 64.2 % — HIGH (ref 13–44)
LYMPHOCYTES # BLD AUTO: 75.9 % — HIGH (ref 13–44)
LYMPHOCYTES # BLD AUTO: 77.3 % — HIGH (ref 13–44)
LYMPHOCYTES # SPEC AUTO: 1 % — HIGH (ref 0–0)
LYMPHOCYTES # SPEC AUTO: 12.3 % — HIGH (ref 0–0)
LYMPHOCYTES # SPEC AUTO: 2 % — HIGH (ref 0–0)
MAGNESIUM SERPL-MCNC: 2 MG/DL — SIGNIFICANT CHANGE UP (ref 1.6–2.6)
MAGNESIUM SERPL-MCNC: 2.1 MG/DL — SIGNIFICANT CHANGE UP (ref 1.6–2.6)
MAGNESIUM SERPL-MCNC: 2.2 MG/DL — SIGNIFICANT CHANGE UP (ref 1.6–2.6)
MAGNESIUM SERPL-MCNC: 2.3 MG/DL — SIGNIFICANT CHANGE UP (ref 1.6–2.6)
MANUAL SMEAR VERIFICATION: SIGNIFICANT CHANGE UP
MCHC RBC-ENTMCNC: 27.8 PG — SIGNIFICANT CHANGE UP (ref 27–34)
MCHC RBC-ENTMCNC: 28 PG — SIGNIFICANT CHANGE UP (ref 27–34)
MCHC RBC-ENTMCNC: 28.3 PG — SIGNIFICANT CHANGE UP (ref 27–34)
MCHC RBC-ENTMCNC: 28.4 PG — SIGNIFICANT CHANGE UP (ref 27–34)
MCHC RBC-ENTMCNC: 28.6 PG — SIGNIFICANT CHANGE UP (ref 27–34)
MCHC RBC-ENTMCNC: 28.7 PG — SIGNIFICANT CHANGE UP (ref 27–34)
MCHC RBC-ENTMCNC: 32.1 GM/DL — SIGNIFICANT CHANGE UP (ref 32–36)
MCHC RBC-ENTMCNC: 32.8 GM/DL — SIGNIFICANT CHANGE UP (ref 32–36)
MCHC RBC-ENTMCNC: 33.1 GM/DL — SIGNIFICANT CHANGE UP (ref 32–36)
MCHC RBC-ENTMCNC: 33.5 GM/DL — SIGNIFICANT CHANGE UP (ref 32–36)
MCHC RBC-ENTMCNC: 33.5 GM/DL — SIGNIFICANT CHANGE UP (ref 32–36)
MCHC RBC-ENTMCNC: 33.8 GM/DL — SIGNIFICANT CHANGE UP (ref 32–36)
MCV RBC AUTO: 84.5 FL — SIGNIFICANT CHANGE UP (ref 80–100)
MCV RBC AUTO: 84.6 FL — SIGNIFICANT CHANGE UP (ref 80–100)
MCV RBC AUTO: 85.4 FL — SIGNIFICANT CHANGE UP (ref 80–100)
MCV RBC AUTO: 85.8 FL — SIGNIFICANT CHANGE UP (ref 80–100)
MCV RBC AUTO: 85.9 FL — SIGNIFICANT CHANGE UP (ref 80–100)
MCV RBC AUTO: 86.5 FL — SIGNIFICANT CHANGE UP (ref 80–100)
METHADONE UR-MCNC: NEGATIVE — SIGNIFICANT CHANGE UP
MICROCYTES BLD QL: SLIGHT — SIGNIFICANT CHANGE UP
MICROCYTES BLD QL: SLIGHT — SIGNIFICANT CHANGE UP
MONOCYTES # BLD AUTO: 0 K/UL — SIGNIFICANT CHANGE UP (ref 0–0.9)
MONOCYTES # BLD AUTO: 0.01 K/UL — SIGNIFICANT CHANGE UP (ref 0–0.9)
MONOCYTES # BLD AUTO: 0.03 K/UL — SIGNIFICANT CHANGE UP (ref 0–0.9)
MONOCYTES # BLD AUTO: 0.03 K/UL — SIGNIFICANT CHANGE UP (ref 0–0.9)
MONOCYTES # BLD AUTO: 0.05 K/UL — SIGNIFICANT CHANGE UP (ref 0–0.9)
MONOCYTES NFR BLD AUTO: 0.9 % — LOW (ref 2–14)
MONOCYTES NFR BLD AUTO: 1 % — LOW (ref 2–14)
MONOCYTES NFR BLD AUTO: 11 % — SIGNIFICANT CHANGE UP (ref 2–14)
MONOCYTES NFR BLD AUTO: 15 % — HIGH (ref 2–14)
MONOCYTES NFR BLD AUTO: 17.2 % — HIGH (ref 2–14)
MYELOCYTES NFR BLD: 1.1 % — HIGH (ref 0–0)
NEUTROPHILS # BLD AUTO: 0.01 K/UL — LOW (ref 1.8–7.4)
NEUTROPHILS # BLD AUTO: 0.01 K/UL — LOW (ref 1.8–7.4)
NEUTROPHILS # BLD AUTO: 0.03 K/UL — LOW (ref 1.8–7.4)
NEUTROPHILS # BLD AUTO: 0.09 K/UL — LOW (ref 1.8–7.4)
NEUTROPHILS # BLD AUTO: 0.28 K/UL — LOW (ref 1.8–7.4)
NEUTROPHILS NFR BLD AUTO: 18 % — LOW (ref 43–77)
NEUTROPHILS NFR BLD AUTO: 2.7 % — LOW (ref 43–77)
NEUTROPHILS NFR BLD AUTO: 29.5 % — LOW (ref 43–77)
NEUTROPHILS NFR BLD AUTO: 3.5 % — LOW (ref 43–77)
NEUTROPHILS NFR BLD AUTO: 31 % — LOW (ref 43–77)
NEUTS BAND # BLD: 0.5 % — SIGNIFICANT CHANGE UP (ref 0–8)
NEUTS BAND # BLD: 1 % — SIGNIFICANT CHANGE UP (ref 0–8)
NIGHT BLUE STAIN TISS: SIGNIFICANT CHANGE UP
NITRITE UR-MCNC: NEGATIVE — SIGNIFICANT CHANGE UP
NRBC # BLD: 0 /100 — SIGNIFICANT CHANGE UP (ref 0–0)
NRBC # BLD: 0 /100 — SIGNIFICANT CHANGE UP (ref 0–0)
OB PNL STL: POSITIVE
OPIATES UR-MCNC: NEGATIVE — SIGNIFICANT CHANGE UP
OVALOCYTES BLD QL SMEAR: SLIGHT — SIGNIFICANT CHANGE UP
PCO2 BLDA: 35 MMHG — SIGNIFICANT CHANGE UP (ref 35–48)
PCP SPEC-MCNC: SIGNIFICANT CHANGE UP
PCP UR-MCNC: NEGATIVE — SIGNIFICANT CHANGE UP
PH BLDA: 7.32 — LOW (ref 7.35–7.45)
PH UR: 7 — SIGNIFICANT CHANGE UP (ref 5–8)
PHOSPHATE SERPL-MCNC: 3.6 MG/DL — SIGNIFICANT CHANGE UP (ref 2.4–4.7)
PHOSPHATE SERPL-MCNC: 4.3 MG/DL — SIGNIFICANT CHANGE UP (ref 2.4–4.7)
PHOSPHATE SERPL-MCNC: 4.6 MG/DL — SIGNIFICANT CHANGE UP (ref 2.4–4.7)
PHOSPHATE SERPL-MCNC: 4.8 MG/DL — HIGH (ref 2.4–4.7)
PLAT MORPH BLD: NORMAL — SIGNIFICANT CHANGE UP
PLATELET # BLD AUTO: 14 K/UL — CRITICAL LOW (ref 150–400)
PLATELET # BLD AUTO: 14 K/UL — CRITICAL LOW (ref 150–400)
PLATELET # BLD AUTO: 16 K/UL — CRITICAL LOW (ref 150–400)
PLATELET # BLD AUTO: 21 K/UL — LOW (ref 150–400)
PLATELET # BLD AUTO: 23 K/UL — LOW (ref 150–400)
PLATELET # BLD AUTO: 7 K/UL — CRITICAL LOW (ref 150–400)
PO2 BLDA: 98 MMHG — SIGNIFICANT CHANGE UP (ref 83–108)
POIKILOCYTOSIS BLD QL AUTO: SIGNIFICANT CHANGE UP
POIKILOCYTOSIS BLD QL AUTO: SLIGHT — SIGNIFICANT CHANGE UP
POLYCHROMASIA BLD QL SMEAR: SIGNIFICANT CHANGE UP
POLYCHROMASIA BLD QL SMEAR: SIGNIFICANT CHANGE UP
POLYCHROMASIA BLD QL SMEAR: SLIGHT — SIGNIFICANT CHANGE UP
POTASSIUM SERPL-MCNC: 3.2 MMOL/L — LOW (ref 3.5–5.3)
POTASSIUM SERPL-MCNC: 3.3 MMOL/L — LOW (ref 3.5–5.3)
POTASSIUM SERPL-MCNC: 3.4 MMOL/L — LOW (ref 3.5–5.3)
POTASSIUM SERPL-MCNC: 3.6 MMOL/L — SIGNIFICANT CHANGE UP (ref 3.5–5.3)
POTASSIUM SERPL-MCNC: 3.6 MMOL/L — SIGNIFICANT CHANGE UP (ref 3.5–5.3)
POTASSIUM SERPL-MCNC: 4.3 MMOL/L — SIGNIFICANT CHANGE UP (ref 3.5–5.3)
POTASSIUM SERPL-MCNC: 5.2 MMOL/L — SIGNIFICANT CHANGE UP (ref 3.5–5.3)
POTASSIUM SERPL-SCNC: 3.2 MMOL/L — LOW (ref 3.5–5.3)
POTASSIUM SERPL-SCNC: 3.3 MMOL/L — LOW (ref 3.5–5.3)
POTASSIUM SERPL-SCNC: 3.4 MMOL/L — LOW (ref 3.5–5.3)
POTASSIUM SERPL-SCNC: 3.6 MMOL/L — SIGNIFICANT CHANGE UP (ref 3.5–5.3)
POTASSIUM SERPL-SCNC: 3.6 MMOL/L — SIGNIFICANT CHANGE UP (ref 3.5–5.3)
POTASSIUM SERPL-SCNC: 4.3 MMOL/L — SIGNIFICANT CHANGE UP (ref 3.5–5.3)
POTASSIUM SERPL-SCNC: 5.2 MMOL/L — SIGNIFICANT CHANGE UP (ref 3.5–5.3)
PROCALCITONIN SERPL-MCNC: 11.99 NG/ML — HIGH (ref 0.02–0.1)
PROT SERPL-MCNC: 3.7 G/DL — LOW (ref 6.6–8.7)
PROT SERPL-MCNC: 3.9 G/DL — LOW (ref 6.6–8.7)
PROT SERPL-MCNC: 4 G/DL — LOW (ref 6.6–8.7)
PROT SERPL-MCNC: 4.1 G/DL — LOW (ref 6.6–8.7)
PROT SERPL-MCNC: 4.1 G/DL — LOW (ref 6.6–8.7)
PROT SERPL-MCNC: 4.6 G/DL — LOW (ref 6.6–8.7)
PROT UR-MCNC: NEGATIVE — SIGNIFICANT CHANGE UP
PROTHROM AB SERPL-ACNC: 19.4 SEC — HIGH (ref 10.5–13.4)
PROTHROM AB SERPL-ACNC: 20.7 SEC — HIGH (ref 10.5–13.4)
PROTHROM AB SERPL-ACNC: 23.1 SEC — HIGH (ref 10.5–13.4)
PROTHROM AB SERPL-ACNC: 36.5 SEC — HIGH (ref 10.5–13.4)
PROTHROM AB SERPL-ACNC: 37.1 SEC — HIGH (ref 10.5–13.4)
RAPID RVP RESULT: SIGNIFICANT CHANGE UP
RBC # BLD: 2.18 M/UL — LOW (ref 4.2–5.8)
RBC # BLD: 2.23 M/UL — LOW (ref 4.2–5.8)
RBC # BLD: 2.39 M/UL — LOW (ref 4.2–5.8)
RBC # BLD: 2.68 M/UL — LOW (ref 4.2–5.8)
RBC # BLD: 3.18 M/UL — LOW (ref 4.2–5.8)
RBC # BLD: 3.2 M/UL — LOW (ref 4.2–5.8)
RBC # BLD: 3.2 M/UL — LOW (ref 4.2–5.8)
RBC # BLD: 3.22 M/UL — LOW (ref 4.2–5.8)
RBC # FLD: 18.4 % — HIGH (ref 10.3–14.5)
RBC # FLD: 19 % — HIGH (ref 10.3–14.5)
RBC # FLD: 19.1 % — HIGH (ref 10.3–14.5)
RBC # FLD: 19.6 % — HIGH (ref 10.3–14.5)
RBC # FLD: 19.7 % — HIGH (ref 10.3–14.5)
RBC # FLD: 20.8 % — HIGH (ref 10.3–14.5)
RBC BLD AUTO: ABNORMAL
RBC BLD AUTO: SIGNIFICANT CHANGE UP
RBC CASTS # UR COMP ASSIST: ABNORMAL /HPF (ref 0–4)
RETICS #: 14.2 K/UL — LOW (ref 25–125)
RETICS #: 15.4 K/UL — LOW (ref 25–125)
RETICS/RBC NFR: 0.5 % — SIGNIFICANT CHANGE UP (ref 0.5–2.5)
RETICS/RBC NFR: 0.7 % — SIGNIFICANT CHANGE UP (ref 0.5–2.5)
SAO2 % BLDA: 98.6 % — HIGH (ref 94–98)
SARS-COV-2 RNA SPEC QL NAA+PROBE: SIGNIFICANT CHANGE UP
SMUDGE CELLS # BLD: PRESENT — SIGNIFICANT CHANGE UP
SMUDGE CELLS # BLD: PRESENT — SIGNIFICANT CHANGE UP
SODIUM SERPL-SCNC: 132 MMOL/L — LOW (ref 135–145)
SODIUM SERPL-SCNC: 135 MMOL/L — SIGNIFICANT CHANGE UP (ref 135–145)
SODIUM SERPL-SCNC: 136 MMOL/L — SIGNIFICANT CHANGE UP (ref 135–145)
SODIUM SERPL-SCNC: 138 MMOL/L — SIGNIFICANT CHANGE UP (ref 135–145)
SODIUM SERPL-SCNC: 140 MMOL/L — SIGNIFICANT CHANGE UP (ref 135–145)
SODIUM SERPL-SCNC: 142 MMOL/L — SIGNIFICANT CHANGE UP (ref 135–145)
SODIUM SERPL-SCNC: 142 MMOL/L — SIGNIFICANT CHANGE UP (ref 135–145)
SP GR SPEC: 1.01 — SIGNIFICANT CHANGE UP (ref 1.01–1.02)
SPECIMEN SOURCE: SIGNIFICANT CHANGE UP
T GONDII IGG SER QL: 9.9 IU/ML — HIGH
T GONDII IGG SER QL: POSITIVE
T PALLIDUM AB TITR SER: NEGATIVE — SIGNIFICANT CHANGE UP
THC UR QL: POSITIVE
TIBC SERPL-MCNC: 132 UG/DL — LOW (ref 220–430)
TRANSFERRIN SERPL-MCNC: 92 MG/DL — LOW (ref 180–329)
TRIGL SERPL-MCNC: 209 MG/DL — HIGH
UROBILINOGEN FLD QL: 12 MG/DL
VARIANT LYMPHS # BLD: 1 % — SIGNIFICANT CHANGE UP (ref 0–6)
VARIANT LYMPHS # BLD: 3 % — SIGNIFICANT CHANGE UP (ref 0–6)
VARIANT LYMPHS # BLD: 3.2 % — SIGNIFICANT CHANGE UP (ref 0–6)
VARIANT LYMPHS # BLD: 4.5 % — SIGNIFICANT CHANGE UP (ref 0–6)
VIT B12 SERPL-MCNC: >2000 PG/ML — HIGH (ref 232–1245)
WBC # BLD: 0.18 K/UL — CRITICAL LOW (ref 3.8–10.5)
WBC # BLD: 0.28 K/UL — CRITICAL LOW (ref 3.8–10.5)
WBC # BLD: 0.28 K/UL — CRITICAL LOW (ref 3.8–10.5)
WBC # BLD: 0.29 K/UL — CRITICAL LOW (ref 3.8–10.5)
WBC # BLD: 0.3 K/UL — CRITICAL LOW (ref 3.8–10.5)
WBC # BLD: 0.94 K/UL — CRITICAL LOW (ref 3.8–10.5)
WBC # FLD AUTO: 0.18 K/UL — CRITICAL LOW (ref 3.8–10.5)
WBC # FLD AUTO: 0.28 K/UL — CRITICAL LOW (ref 3.8–10.5)
WBC # FLD AUTO: 0.28 K/UL — CRITICAL LOW (ref 3.8–10.5)
WBC # FLD AUTO: 0.29 K/UL — CRITICAL LOW (ref 3.8–10.5)
WBC # FLD AUTO: 0.3 K/UL — CRITICAL LOW (ref 3.8–10.5)
WBC # FLD AUTO: 0.94 K/UL — CRITICAL LOW (ref 3.8–10.5)
WBC UR QL: SIGNIFICANT CHANGE UP /HPF (ref 0–5)

## 2023-01-01 PROCEDURE — 86923 COMPATIBILITY TEST ELECTRIC: CPT

## 2023-01-01 PROCEDURE — 99233 SBSQ HOSP IP/OBS HIGH 50: CPT

## 2023-01-01 PROCEDURE — 76705 ECHO EXAM OF ABDOMEN: CPT | Mod: 26

## 2023-01-01 PROCEDURE — 84478 ASSAY OF TRIGLYCERIDES: CPT

## 2023-01-01 PROCEDURE — 83520 IMMUNOASSAY QUANT NOS NONAB: CPT

## 2023-01-01 PROCEDURE — 82435 ASSAY OF BLOOD CHLORIDE: CPT

## 2023-01-01 PROCEDURE — 84466 ASSAY OF TRANSFERRIN: CPT

## 2023-01-01 PROCEDURE — P9040: CPT

## 2023-01-01 PROCEDURE — 82947 ASSAY GLUCOSE BLOOD QUANT: CPT

## 2023-01-01 PROCEDURE — 85027 COMPLETE CBC AUTOMATED: CPT

## 2023-01-01 PROCEDURE — 87389 HIV-1 AG W/HIV-1&-2 AB AG IA: CPT

## 2023-01-01 PROCEDURE — 82728 ASSAY OF FERRITIN: CPT

## 2023-01-01 PROCEDURE — 83550 IRON BINDING TEST: CPT

## 2023-01-01 PROCEDURE — 85045 AUTOMATED RETICULOCYTE COUNT: CPT

## 2023-01-01 PROCEDURE — 83615 LACTATE (LD) (LDH) ENZYME: CPT

## 2023-01-01 PROCEDURE — 99223 1ST HOSP IP/OBS HIGH 75: CPT

## 2023-01-01 PROCEDURE — 70450 CT HEAD/BRAIN W/O DYE: CPT | Mod: 26,MA

## 2023-01-01 PROCEDURE — 93010 ELECTROCARDIOGRAM REPORT: CPT

## 2023-01-01 PROCEDURE — 93005 ELECTROCARDIOGRAM TRACING: CPT

## 2023-01-01 PROCEDURE — 74177 CT ABD & PELVIS W/CONTRAST: CPT | Mod: 26,MA

## 2023-01-01 PROCEDURE — 86703 HIV-1/HIV-2 1 RESULT ANTBDY: CPT

## 2023-01-01 PROCEDURE — 87015 SPECIMEN INFECT AGNT CONCNTJ: CPT

## 2023-01-01 PROCEDURE — 83735 ASSAY OF MAGNESIUM: CPT

## 2023-01-01 PROCEDURE — 99222 1ST HOSP IP/OBS MODERATE 55: CPT

## 2023-01-01 PROCEDURE — 82248 BILIRUBIN DIRECT: CPT

## 2023-01-01 PROCEDURE — 80053 COMPREHEN METABOLIC PANEL: CPT

## 2023-01-01 PROCEDURE — 82803 BLOOD GASES ANY COMBINATION: CPT

## 2023-01-01 PROCEDURE — 71045 X-RAY EXAM CHEST 1 VIEW: CPT | Mod: 26

## 2023-01-01 PROCEDURE — 36430 TRANSFUSION BLD/BLD COMPNT: CPT

## 2023-01-01 PROCEDURE — 99221 1ST HOSP IP/OBS SF/LOW 40: CPT

## 2023-01-01 PROCEDURE — T1013: CPT

## 2023-01-01 PROCEDURE — 83690 ASSAY OF LIPASE: CPT

## 2023-01-01 PROCEDURE — 71045 X-RAY EXAM CHEST 1 VIEW: CPT

## 2023-01-01 PROCEDURE — 87206 SMEAR FLUORESCENT/ACID STAI: CPT

## 2023-01-01 PROCEDURE — 71260 CT THORAX DX C+: CPT | Mod: MA

## 2023-01-01 PROCEDURE — 99498 ADVNCD CARE PLAN ADDL 30 MIN: CPT | Mod: 25

## 2023-01-01 PROCEDURE — 99497 ADVNCD CARE PLAN 30 MIN: CPT

## 2023-01-01 PROCEDURE — 86901 BLOOD TYPING SEROLOGIC RH(D): CPT

## 2023-01-01 PROCEDURE — 85384 FIBRINOGEN ACTIVITY: CPT

## 2023-01-01 PROCEDURE — 70450 CT HEAD/BRAIN W/O DYE: CPT | Mod: MA

## 2023-01-01 PROCEDURE — 84132 ASSAY OF SERUM POTASSIUM: CPT

## 2023-01-01 PROCEDURE — 80307 DRUG TEST PRSMV CHEM ANLYZR: CPT

## 2023-01-01 PROCEDURE — 85025 COMPLETE CBC W/AUTO DIFF WBC: CPT

## 2023-01-01 PROCEDURE — 99232 SBSQ HOSP IP/OBS MODERATE 35: CPT

## 2023-01-01 PROCEDURE — 82607 VITAMIN B-12: CPT

## 2023-01-01 PROCEDURE — 82962 GLUCOSE BLOOD TEST: CPT

## 2023-01-01 PROCEDURE — 86900 BLOOD TYPING SEROLOGIC ABO: CPT

## 2023-01-01 PROCEDURE — 81001 URINALYSIS AUTO W/SCOPE: CPT

## 2023-01-01 PROCEDURE — 99497 ADVNCD CARE PLAN 30 MIN: CPT | Mod: 25

## 2023-01-01 PROCEDURE — 94640 AIRWAY INHALATION TREATMENT: CPT

## 2023-01-01 PROCEDURE — P9100: CPT

## 2023-01-01 PROCEDURE — 87305 ASPERGILLUS AG IA: CPT

## 2023-01-01 PROCEDURE — 94660 CPAP INITIATION&MGMT: CPT

## 2023-01-01 PROCEDURE — 82247 BILIRUBIN TOTAL: CPT

## 2023-01-01 PROCEDURE — 71260 CT THORAX DX C+: CPT | Mod: 26,MA

## 2023-01-01 PROCEDURE — 82272 OCCULT BLD FECES 1-3 TESTS: CPT

## 2023-01-01 PROCEDURE — 87799 DETECT AGENT NOS DNA QUANT: CPT

## 2023-01-01 PROCEDURE — 80074 ACUTE HEPATITIS PANEL: CPT

## 2023-01-01 PROCEDURE — 86702 HIV-2 ANTIBODY: CPT

## 2023-01-01 PROCEDURE — 87385 HISTOPLASMA CAPSUL AG IA: CPT

## 2023-01-01 PROCEDURE — 86777 TOXOPLASMA ANTIBODY: CPT

## 2023-01-01 PROCEDURE — 96374 THER/PROPH/DIAG INJ IV PUSH: CPT

## 2023-01-01 PROCEDURE — P9047: CPT

## 2023-01-01 PROCEDURE — 86850 RBC ANTIBODY SCREEN: CPT

## 2023-01-01 PROCEDURE — 80076 HEPATIC FUNCTION PANEL: CPT

## 2023-01-01 PROCEDURE — 80048 BASIC METABOLIC PNL TOTAL CA: CPT

## 2023-01-01 PROCEDURE — 84100 ASSAY OF PHOSPHORUS: CPT

## 2023-01-01 PROCEDURE — 87116 MYCOBACTERIA CULTURE: CPT

## 2023-01-01 PROCEDURE — 87449 NOS EACH ORGANISM AG IA: CPT

## 2023-01-01 PROCEDURE — 99285 EMERGENCY DEPT VISIT HI MDM: CPT

## 2023-01-01 PROCEDURE — 85730 THROMBOPLASTIN TIME PARTIAL: CPT

## 2023-01-01 PROCEDURE — 84295 ASSAY OF SERUM SODIUM: CPT

## 2023-01-01 PROCEDURE — 85014 HEMATOCRIT: CPT

## 2023-01-01 PROCEDURE — 87040 BLOOD CULTURE FOR BACTERIA: CPT

## 2023-01-01 PROCEDURE — 74177 CT ABD & PELVIS W/CONTRAST: CPT | Mod: MA

## 2023-01-01 PROCEDURE — 83605 ASSAY OF LACTIC ACID: CPT

## 2023-01-01 PROCEDURE — 83540 ASSAY OF IRON: CPT

## 2023-01-01 PROCEDURE — 83010 ASSAY OF HAPTOGLOBIN QUANT: CPT

## 2023-01-01 PROCEDURE — 99285 EMERGENCY DEPT VISIT HI MDM: CPT | Mod: 25

## 2023-01-01 PROCEDURE — 85610 PROTHROMBIN TIME: CPT

## 2023-01-01 PROCEDURE — 87536 HIV-1 QUANT&REVRSE TRNSCRPJ: CPT

## 2023-01-01 PROCEDURE — 76705 ECHO EXAM OF ABDOMEN: CPT

## 2023-01-01 PROCEDURE — P9037: CPT

## 2023-01-01 PROCEDURE — 82140 ASSAY OF AMMONIA: CPT

## 2023-01-01 PROCEDURE — 0225U NFCT DS DNA&RNA 21 SARSCOV2: CPT

## 2023-01-01 PROCEDURE — 86403 PARTICLE AGGLUT ANTBDY SCRN: CPT

## 2023-01-01 PROCEDURE — 87086 URINE CULTURE/COLONY COUNT: CPT

## 2023-01-01 PROCEDURE — 84145 PROCALCITONIN (PCT): CPT

## 2023-01-01 PROCEDURE — 86780 TREPONEMA PALLIDUM: CPT

## 2023-01-01 PROCEDURE — P9016: CPT

## 2023-01-01 PROCEDURE — 85018 HEMOGLOBIN: CPT

## 2023-01-01 PROCEDURE — 82330 ASSAY OF CALCIUM: CPT

## 2023-01-01 PROCEDURE — 99498 ADVNCD CARE PLAN ADDL 30 MIN: CPT

## 2023-01-01 PROCEDURE — 36415 COLL VENOUS BLD VENIPUNCTURE: CPT

## 2023-01-01 RX ORDER — DEXAMETHASONE 0.5 MG/5ML
20 ELIXIR ORAL DAILY
Refills: 0 | Status: DISCONTINUED | OUTPATIENT
Start: 2023-01-01 | End: 2023-01-01

## 2023-01-01 RX ORDER — FUROSEMIDE 40 MG
20 TABLET ORAL ONCE
Refills: 0 | Status: COMPLETED | OUTPATIENT
Start: 2023-01-01 | End: 2023-01-01

## 2023-01-01 RX ORDER — CHLORHEXIDINE GLUCONATE 213 G/1000ML
1 SOLUTION TOPICAL
Refills: 0 | Status: DISCONTINUED | OUTPATIENT
Start: 2023-01-01 | End: 2023-01-01

## 2023-01-01 RX ORDER — POTASSIUM CHLORIDE 20 MEQ
10 PACKET (EA) ORAL
Refills: 0 | Status: COMPLETED | OUTPATIENT
Start: 2023-01-01 | End: 2023-01-01

## 2023-01-01 RX ORDER — PIPERACILLIN AND TAZOBACTAM 4; .5 G/20ML; G/20ML
3.38 INJECTION, POWDER, LYOPHILIZED, FOR SOLUTION INTRAVENOUS ONCE
Refills: 0 | Status: COMPLETED | OUTPATIENT
Start: 2023-01-01 | End: 2023-01-01

## 2023-01-01 RX ORDER — HYDROMORPHONE HYDROCHLORIDE 2 MG/ML
2 INJECTION INTRAMUSCULAR; INTRAVENOUS; SUBCUTANEOUS
Qty: 100 | Refills: 0 | Status: DISCONTINUED | OUTPATIENT
Start: 2023-01-01 | End: 2023-01-01

## 2023-01-01 RX ORDER — PANTOPRAZOLE SODIUM 20 MG/1
80 TABLET, DELAYED RELEASE ORAL ONCE
Refills: 0 | Status: COMPLETED | OUTPATIENT
Start: 2023-01-01 | End: 2023-01-01

## 2023-01-01 RX ORDER — HYDROMORPHONE HYDROCHLORIDE 2 MG/ML
4 INJECTION INTRAMUSCULAR; INTRAVENOUS; SUBCUTANEOUS ONCE
Refills: 0 | Status: DISCONTINUED | OUTPATIENT
Start: 2023-01-01 | End: 2023-01-01

## 2023-01-01 RX ORDER — CALCIUM GLUCONATE 100 MG/ML
2 VIAL (ML) INTRAVENOUS ONCE
Refills: 0 | Status: COMPLETED | OUTPATIENT
Start: 2023-01-01 | End: 2023-01-01

## 2023-01-01 RX ORDER — BUPRENORPHINE AND NALOXONE 2; .5 MG/1; MG/1
1 TABLET SUBLINGUAL THREE TIMES A DAY
Refills: 0 | Status: DISCONTINUED | OUTPATIENT
Start: 2023-01-01 | End: 2023-01-01

## 2023-01-01 RX ORDER — PHYTONADIONE (VIT K1) 5 MG
10 TABLET ORAL DAILY
Refills: 0 | Status: DISCONTINUED | OUTPATIENT
Start: 2023-01-01 | End: 2023-01-01

## 2023-01-01 RX ORDER — SODIUM CHLORIDE 9 MG/ML
4 INJECTION INTRAMUSCULAR; INTRAVENOUS; SUBCUTANEOUS EVERY 4 HOURS
Refills: 0 | Status: COMPLETED | OUTPATIENT
Start: 2023-01-01 | End: 2023-01-01

## 2023-01-01 RX ORDER — NYSTATIN 500MM UNIT
500000 POWDER (EA) MISCELLANEOUS
Refills: 0 | Status: DISCONTINUED | OUTPATIENT
Start: 2023-01-01 | End: 2023-01-01

## 2023-01-01 RX ORDER — PIPERACILLIN AND TAZOBACTAM 4; .5 G/20ML; G/20ML
3.38 INJECTION, POWDER, LYOPHILIZED, FOR SOLUTION INTRAVENOUS EVERY 8 HOURS
Refills: 0 | Status: DISCONTINUED | OUTPATIENT
Start: 2023-01-01 | End: 2023-01-01

## 2023-01-01 RX ORDER — ERTAPENEM SODIUM 1 G/1
INJECTION, POWDER, LYOPHILIZED, FOR SOLUTION INTRAMUSCULAR; INTRAVENOUS
Refills: 0 | Status: DISCONTINUED | OUTPATIENT
Start: 2023-01-01 | End: 2023-01-01

## 2023-01-01 RX ORDER — FLUCONAZOLE 150 MG/1
TABLET ORAL
Refills: 0 | Status: DISCONTINUED | OUTPATIENT
Start: 2023-01-01 | End: 2023-01-01

## 2023-01-01 RX ORDER — FENTANYL CITRATE 50 UG/ML
100 INJECTION INTRAVENOUS ONCE
Refills: 0 | Status: DISCONTINUED | OUTPATIENT
Start: 2023-01-01 | End: 2023-01-01

## 2023-01-01 RX ORDER — HYDROMORPHONE HYDROCHLORIDE 2 MG/ML
4 INJECTION INTRAMUSCULAR; INTRAVENOUS; SUBCUTANEOUS
Refills: 0 | Status: DISCONTINUED | OUTPATIENT
Start: 2023-01-01 | End: 2023-01-01

## 2023-01-01 RX ORDER — ALBUMIN HUMAN 25 %
50 VIAL (ML) INTRAVENOUS ONCE
Refills: 0 | Status: COMPLETED | OUTPATIENT
Start: 2023-01-01 | End: 2023-01-01

## 2023-01-01 RX ORDER — PANTOPRAZOLE SODIUM 20 MG/1
40 TABLET, DELAYED RELEASE ORAL EVERY 12 HOURS
Refills: 0 | Status: DISCONTINUED | OUTPATIENT
Start: 2023-01-01 | End: 2023-01-01

## 2023-01-01 RX ORDER — THIAMINE MONONITRATE (VIT B1) 100 MG
500 TABLET ORAL EVERY 8 HOURS
Refills: 0 | Status: DISCONTINUED | OUTPATIENT
Start: 2023-01-01 | End: 2023-01-01

## 2023-01-01 RX ORDER — POTASSIUM CHLORIDE 20 MEQ
10 PACKET (EA) ORAL
Refills: 0 | Status: DISCONTINUED | OUTPATIENT
Start: 2023-01-01 | End: 2023-01-01

## 2023-01-01 RX ORDER — ACETAMINOPHEN 500 MG
650 TABLET ORAL ONCE
Refills: 0 | Status: COMPLETED | OUTPATIENT
Start: 2023-01-01 | End: 2023-01-01

## 2023-01-01 RX ORDER — DIAZEPAM 5 MG
10 TABLET ORAL ONCE
Refills: 0 | Status: DISCONTINUED | OUTPATIENT
Start: 2023-01-01 | End: 2023-01-01

## 2023-01-01 RX ORDER — SODIUM CHLORIDE 9 MG/ML
2000 INJECTION, SOLUTION INTRAVENOUS ONCE
Refills: 0 | Status: COMPLETED | OUTPATIENT
Start: 2023-01-01 | End: 2023-01-01

## 2023-01-01 RX ORDER — SODIUM CHLORIDE 9 MG/ML
1000 INJECTION INTRAMUSCULAR; INTRAVENOUS; SUBCUTANEOUS
Refills: 0 | Status: DISCONTINUED | OUTPATIENT
Start: 2023-01-01 | End: 2023-01-01

## 2023-01-01 RX ORDER — FLUCONAZOLE 150 MG/1
400 TABLET ORAL ONCE
Refills: 0 | Status: COMPLETED | OUTPATIENT
Start: 2023-01-01 | End: 2023-01-01

## 2023-01-01 RX ORDER — ERTAPENEM SODIUM 1 G/1
1000 INJECTION, POWDER, LYOPHILIZED, FOR SOLUTION INTRAMUSCULAR; INTRAVENOUS EVERY 24 HOURS
Refills: 0 | Status: DISCONTINUED | OUTPATIENT
Start: 2023-01-01 | End: 2023-01-01

## 2023-01-01 RX ORDER — LACTULOSE 10 G/15ML
200 SOLUTION ORAL ONCE
Refills: 0 | Status: COMPLETED | OUTPATIENT
Start: 2023-01-01 | End: 2023-01-01

## 2023-01-01 RX ORDER — LACTULOSE 10 G/15ML
20 SOLUTION ORAL THREE TIMES A DAY
Refills: 0 | Status: DISCONTINUED | OUTPATIENT
Start: 2023-01-01 | End: 2023-01-01

## 2023-01-01 RX ORDER — ACETAMINOPHEN 500 MG
1000 TABLET ORAL ONCE
Refills: 0 | Status: COMPLETED | OUTPATIENT
Start: 2023-01-01 | End: 2023-01-01

## 2023-01-01 RX ORDER — ERTAPENEM SODIUM 1 G/1
1000 INJECTION, POWDER, LYOPHILIZED, FOR SOLUTION INTRAMUSCULAR; INTRAVENOUS ONCE
Refills: 0 | Status: COMPLETED | OUTPATIENT
Start: 2023-01-01 | End: 2023-01-01

## 2023-01-01 RX ORDER — HYDROMORPHONE HYDROCHLORIDE 2 MG/ML
2 INJECTION INTRAMUSCULAR; INTRAVENOUS; SUBCUTANEOUS
Refills: 0 | Status: DISCONTINUED | OUTPATIENT
Start: 2023-01-01 | End: 2023-01-01

## 2023-01-01 RX ORDER — SODIUM CHLORIDE 9 MG/ML
1000 INJECTION INTRAMUSCULAR; INTRAVENOUS; SUBCUTANEOUS ONCE
Refills: 0 | Status: COMPLETED | OUTPATIENT
Start: 2023-01-01 | End: 2023-01-01

## 2023-01-01 RX ORDER — FLUCONAZOLE 150 MG/1
200 TABLET ORAL EVERY 24 HOURS
Refills: 0 | Status: DISCONTINUED | OUTPATIENT
Start: 2023-01-01 | End: 2023-01-01

## 2023-01-01 RX ADMIN — BUPRENORPHINE AND NALOXONE 1 FILM(S): 2; .5 TABLET SUBLINGUAL at 22:12

## 2023-01-01 RX ADMIN — PANTOPRAZOLE SODIUM 40 MILLIGRAM(S): 20 TABLET, DELAYED RELEASE ORAL at 17:38

## 2023-01-01 RX ADMIN — Medication 105 MILLIGRAM(S): at 14:39

## 2023-01-01 RX ADMIN — HYDROMORPHONE HYDROCHLORIDE 2 MILLIGRAM(S): 2 INJECTION INTRAMUSCULAR; INTRAVENOUS; SUBCUTANEOUS at 18:52

## 2023-01-01 RX ADMIN — Medication 500000 UNIT(S): at 05:11

## 2023-01-01 RX ADMIN — Medication 100 MILLIEQUIVALENT(S): at 00:11

## 2023-01-01 RX ADMIN — SODIUM CHLORIDE 125 MILLILITER(S): 9 INJECTION INTRAMUSCULAR; INTRAVENOUS; SUBCUTANEOUS at 20:10

## 2023-01-01 RX ADMIN — Medication 100 MILLIEQUIVALENT(S): at 23:03

## 2023-01-01 RX ADMIN — Medication 1 MILLIGRAM(S): at 06:21

## 2023-01-01 RX ADMIN — Medication 1000 MILLIGRAM(S): at 00:35

## 2023-01-01 RX ADMIN — CHLORHEXIDINE GLUCONATE 1 APPLICATION(S): 213 SOLUTION TOPICAL at 17:48

## 2023-01-01 RX ADMIN — SODIUM CHLORIDE 1000 MILLILITER(S): 9 INJECTION INTRAMUSCULAR; INTRAVENOUS; SUBCUTANEOUS at 18:26

## 2023-01-01 RX ADMIN — Medication 105 MILLIGRAM(S): at 23:05

## 2023-01-01 RX ADMIN — HYDROMORPHONE HYDROCHLORIDE 2 MG/HR: 2 INJECTION INTRAMUSCULAR; INTRAVENOUS; SUBCUTANEOUS at 07:43

## 2023-01-01 RX ADMIN — Medication 105 MILLIGRAM(S): at 06:36

## 2023-01-01 RX ADMIN — FENTANYL CITRATE 100 MICROGRAM(S): 50 INJECTION INTRAVENOUS at 04:40

## 2023-01-01 RX ADMIN — Medication 50 MILLILITER(S): at 22:11

## 2023-01-01 RX ADMIN — PANTOPRAZOLE SODIUM 40 MILLIGRAM(S): 20 TABLET, DELAYED RELEASE ORAL at 05:04

## 2023-01-01 RX ADMIN — FENTANYL CITRATE 100 MICROGRAM(S): 50 INJECTION INTRAVENOUS at 04:25

## 2023-01-01 RX ADMIN — SODIUM CHLORIDE 2000 MILLILITER(S): 9 INJECTION, SOLUTION INTRAVENOUS at 15:43

## 2023-01-01 RX ADMIN — BUPRENORPHINE AND NALOXONE 1 FILM(S): 2; .5 TABLET SUBLINGUAL at 13:03

## 2023-01-01 RX ADMIN — PIPERACILLIN AND TAZOBACTAM 200 GRAM(S): 4; .5 INJECTION, POWDER, LYOPHILIZED, FOR SOLUTION INTRAVENOUS at 17:14

## 2023-01-01 RX ADMIN — Medication 200 GRAM(S): at 22:29

## 2023-01-01 RX ADMIN — ERTAPENEM SODIUM 120 MILLIGRAM(S): 1 INJECTION, POWDER, LYOPHILIZED, FOR SOLUTION INTRAMUSCULAR; INTRAVENOUS at 23:17

## 2023-01-01 RX ADMIN — Medication 500000 UNIT(S): at 16:29

## 2023-01-01 RX ADMIN — Medication 10 MILLIGRAM(S): at 12:37

## 2023-01-01 RX ADMIN — Medication 100 MILLIEQUIVALENT(S): at 01:12

## 2023-01-01 RX ADMIN — SODIUM CHLORIDE 1000 MILLILITER(S): 9 INJECTION INTRAMUSCULAR; INTRAVENOUS; SUBCUTANEOUS at 14:50

## 2023-01-01 RX ADMIN — Medication 100 MILLIEQUIVALENT(S): at 11:26

## 2023-01-01 RX ADMIN — Medication 500000 UNIT(S): at 23:17

## 2023-01-01 RX ADMIN — Medication 1 MILLIGRAM(S): at 02:39

## 2023-01-01 RX ADMIN — PIPERACILLIN AND TAZOBACTAM 25 GRAM(S): 4; .5 INJECTION, POWDER, LYOPHILIZED, FOR SOLUTION INTRAVENOUS at 07:17

## 2023-01-01 RX ADMIN — PANTOPRAZOLE SODIUM 40 MILLIGRAM(S): 20 TABLET, DELAYED RELEASE ORAL at 18:42

## 2023-01-01 RX ADMIN — HYDROMORPHONE HYDROCHLORIDE 2 MILLIGRAM(S): 2 INJECTION INTRAMUSCULAR; INTRAVENOUS; SUBCUTANEOUS at 19:52

## 2023-01-01 RX ADMIN — PANTOPRAZOLE SODIUM 40 MILLIGRAM(S): 20 TABLET, DELAYED RELEASE ORAL at 06:36

## 2023-01-01 RX ADMIN — HYDROMORPHONE HYDROCHLORIDE 2 MG/HR: 2 INJECTION INTRAMUSCULAR; INTRAVENOUS; SUBCUTANEOUS at 21:02

## 2023-01-01 RX ADMIN — Medication 100 MILLIEQUIVALENT(S): at 09:36

## 2023-01-01 RX ADMIN — Medication 650 MILLIGRAM(S): at 18:11

## 2023-01-01 RX ADMIN — BUPRENORPHINE AND NALOXONE 1 FILM(S): 2; .5 TABLET SUBLINGUAL at 17:29

## 2023-01-01 RX ADMIN — Medication 110 MILLIGRAM(S): at 17:47

## 2023-01-01 RX ADMIN — PIPERACILLIN AND TAZOBACTAM 200 GRAM(S): 4; .5 INJECTION, POWDER, LYOPHILIZED, FOR SOLUTION INTRAVENOUS at 10:34

## 2023-01-01 RX ADMIN — SODIUM CHLORIDE 125 MILLILITER(S): 9 INJECTION INTRAMUSCULAR; INTRAVENOUS; SUBCUTANEOUS at 16:30

## 2023-01-01 RX ADMIN — HYDROMORPHONE HYDROCHLORIDE 4 MILLIGRAM(S): 2 INJECTION INTRAMUSCULAR; INTRAVENOUS; SUBCUTANEOUS at 03:02

## 2023-01-01 RX ADMIN — Medication 105 MILLIGRAM(S): at 21:04

## 2023-01-01 RX ADMIN — HYDROMORPHONE HYDROCHLORIDE 2 MILLIGRAM(S): 2 INJECTION INTRAMUSCULAR; INTRAVENOUS; SUBCUTANEOUS at 03:47

## 2023-01-01 RX ADMIN — SODIUM CHLORIDE 4 MILLILITER(S): 9 INJECTION INTRAMUSCULAR; INTRAVENOUS; SUBCUTANEOUS at 22:08

## 2023-01-01 RX ADMIN — HYDROMORPHONE HYDROCHLORIDE 2 MG/HR: 2 INJECTION INTRAMUSCULAR; INTRAVENOUS; SUBCUTANEOUS at 00:07

## 2023-01-01 RX ADMIN — HYDROMORPHONE HYDROCHLORIDE 2 MILLIGRAM(S): 2 INJECTION INTRAMUSCULAR; INTRAVENOUS; SUBCUTANEOUS at 03:32

## 2023-01-01 RX ADMIN — LACTULOSE 20 GRAM(S): 10 SOLUTION ORAL at 18:41

## 2023-01-01 RX ADMIN — LACTULOSE 200 GRAM(S): 10 SOLUTION ORAL at 18:41

## 2023-01-01 RX ADMIN — Medication 400 MILLIGRAM(S): at 23:35

## 2023-01-01 RX ADMIN — HYDROMORPHONE HYDROCHLORIDE 2 MG/HR: 2 INJECTION INTRAMUSCULAR; INTRAVENOUS; SUBCUTANEOUS at 21:30

## 2023-01-01 RX ADMIN — PIPERACILLIN AND TAZOBACTAM 25 GRAM(S): 4; .5 INJECTION, POWDER, LYOPHILIZED, FOR SOLUTION INTRAVENOUS at 23:04

## 2023-01-01 RX ADMIN — FLUCONAZOLE 50 MILLIGRAM(S): 150 TABLET ORAL at 14:39

## 2023-01-01 RX ADMIN — SODIUM CHLORIDE 4 MILLILITER(S): 9 INJECTION INTRAMUSCULAR; INTRAVENOUS; SUBCUTANEOUS at 16:21

## 2023-01-01 RX ADMIN — Medication 500000 UNIT(S): at 12:37

## 2023-01-01 RX ADMIN — BUPRENORPHINE AND NALOXONE 1 FILM(S): 2; .5 TABLET SUBLINGUAL at 05:11

## 2023-01-01 RX ADMIN — HYDROMORPHONE HYDROCHLORIDE 2 MG/HR: 2 INJECTION INTRAMUSCULAR; INTRAVENOUS; SUBCUTANEOUS at 19:12

## 2023-01-01 RX ADMIN — FLUCONAZOLE 100 MILLIGRAM(S): 150 TABLET ORAL at 16:17

## 2023-01-01 RX ADMIN — PANTOPRAZOLE SODIUM 40 MILLIGRAM(S): 20 TABLET, DELAYED RELEASE ORAL at 05:12

## 2023-01-01 RX ADMIN — ERTAPENEM SODIUM 120 MILLIGRAM(S): 1 INJECTION, POWDER, LYOPHILIZED, FOR SOLUTION INTRAMUSCULAR; INTRAVENOUS at 22:39

## 2023-01-01 RX ADMIN — Medication 10 MILLIGRAM(S): at 16:30

## 2023-01-01 RX ADMIN — PANTOPRAZOLE SODIUM 80 MILLIGRAM(S): 20 TABLET, DELAYED RELEASE ORAL at 20:15

## 2023-01-01 RX ADMIN — HYDROMORPHONE HYDROCHLORIDE 4 MILLIGRAM(S): 2 INJECTION INTRAMUSCULAR; INTRAVENOUS; SUBCUTANEOUS at 02:47

## 2023-01-01 RX ADMIN — Medication 100 MILLIEQUIVALENT(S): at 12:37

## 2023-01-01 RX ADMIN — Medication 20 MILLIGRAM(S): at 06:36

## 2023-01-01 RX ADMIN — Medication 100 MILLIEQUIVALENT(S): at 02:13

## 2023-01-01 RX ADMIN — BUPRENORPHINE AND NALOXONE 1 FILM(S): 2; .5 TABLET SUBLINGUAL at 22:06

## 2023-01-01 RX ADMIN — HYDROMORPHONE HYDROCHLORIDE 2 MG/HR: 2 INJECTION INTRAMUSCULAR; INTRAVENOUS; SUBCUTANEOUS at 23:52

## 2023-01-01 RX ADMIN — Medication 1 MILLIGRAM(S): at 09:40

## 2023-01-01 RX ADMIN — PANTOPRAZOLE SODIUM 40 MILLIGRAM(S): 20 TABLET, DELAYED RELEASE ORAL at 16:29

## 2023-01-01 RX ADMIN — PIPERACILLIN AND TAZOBACTAM 25 GRAM(S): 4; .5 INJECTION, POWDER, LYOPHILIZED, FOR SOLUTION INTRAVENOUS at 14:59

## 2023-01-01 RX ADMIN — SODIUM CHLORIDE 125 MILLILITER(S): 9 INJECTION INTRAMUSCULAR; INTRAVENOUS; SUBCUTANEOUS at 21:38

## 2023-01-01 RX ADMIN — Medication 10 MILLIGRAM(S): at 17:14

## 2023-01-01 RX ADMIN — PIPERACILLIN AND TAZOBACTAM 25 GRAM(S): 4; .5 INJECTION, POWDER, LYOPHILIZED, FOR SOLUTION INTRAVENOUS at 22:07

## 2023-01-01 RX ADMIN — Medication 650 MILLIGRAM(S): at 19:11

## 2023-05-01 NOTE — H&P ADULT - HISTORY OF PRESENT ILLNESS
pt. is a 53 year old male with PMHx , hiv , ETOH abuse (last drink 3 years ago), opiate abuse (last use 1 year ago, has been on suboxone) presenting for evaluation of 2 weeks of worsening jaundice, decreased PO, weight loss, confusion. pt's sister at bedside providing history as patient is AOx1 (self) at this time. Earlier pt's Nephew stated that he sees patient daily, was started on a number of medications 3 weeks ago which he thinks were psychiatric medications, then 2 weeks ago began to decompensate. Over last 3-4 days patient was no longer oriented to time or place. Patient lives alone, nephew went to see his apartment today and noted multiple packets of suboxone on the ground, is concerned patient is taking too much. No recent fevers, vomiting, abd pain. As per pt's sister pt. not taking his meds, hiv meds , pt. cannot verify if using his hiv meds. pt. is pancytopenic in the ER, occult blood positive in stool, Hb 6.7 , getting 2 units of prbc.

## 2023-05-01 NOTE — PATIENT PROFILE ADULT - FALL HARM RISK - HARM RISK INTERVENTIONS
Assistance with ambulation/Assistance OOB with selected safe patient handling equipment/Communicate Risk of Fall with Harm to all staff/Discuss with provider need for PT consult/Monitor gait and stability/Reinforce activity limits and safety measures with patient and family/Tailored Fall Risk Interventions/Visual Cue: Yellow wristband and red socks/Bed in lowest position, wheels locked, appropriate side rails in place/Call bell, personal items and telephone in reach/Instruct patient to call for assistance before getting out of bed or chair/Non-slip footwear when patient is out of bed/[place] to call system/Physically safe environment - no spills, clutter or unnecessary equipment/Purposeful Proactive Rounding/Room/bathroom lighting operational, light cord in reach

## 2023-05-01 NOTE — ED PROVIDER NOTE - PHYSICAL EXAMINATION
Gen: appears cachectic, confused  Head: normocephalic, atraumatic  EENT: EOMI, PERRLA, dry mucous membranes, (+) scleral icterus, no JVD  Lung: no increased work of breathing, clear to auscultation bilaterally, no wheezing, rales, rhonchi, speaking in full sentences  CV: tachycardic rate, regular rhythm  Abd: soft, non-tender, non-distended, no rebound tenderness or guarding; no CVA tenderness  MSK: No edema, no visible deformities, full range of motion in all 4 extremities  Neuro: Awake, alert, no focal neurologic deficits. Following directions  Skin: No obvious rash, (+)jaundice

## 2023-05-01 NOTE — ED PROVIDER NOTE - CLINICAL SUMMARY MEDICAL DECISION MAKING FREE TEXT BOX
53 year old male presenting with AMS, jaundice, decreased PO worsening over 2 weeks. History from Nephew at bedside. Tachycardic to 110-120s, hypoxic to 94% on 4L NC, no O2 requirement at baseline. Plans - cardiac monitor, labs, CT head, chest, abd/pelv, EKG, IV hydration.

## 2023-05-01 NOTE — ED ADULT TRIAGE NOTE - CHIEF COMPLAINT QUOTE
BIBEMS for AMS and jaundice for the past 2 weeks according to family. PT is A&O to self only at this time. Family reports pt has been taking more than prescribed of his Suboxone lately. Hx og drug and alcohol abuse.

## 2023-05-01 NOTE — H&P ADULT - NSICDXPASTMEDICALHX_GEN_ALL_CORE_FT
PAST MEDICAL HISTORY:  Alcohol abuse     Heroin abuse     Knee pain, right     Lumbar pain Pt states that he is having an AXEL on 7/30    Nose pain     Rotator cuff tear, right      PAST MEDICAL HISTORY:  Alcohol abuse     Heroin abuse     HIV disease     Knee pain, right     Lumbar pain Pt states that he is having an AXEL on 7/30    Nose pain     Rotator cuff tear, right

## 2023-05-01 NOTE — PATIENT PROFILE ADULT - INFORMATION COULD NOT BE OBTAINED DETAILS
Pt acutely confused, spouse not present at the moment Pt acutely confused spouse not present at the moment

## 2023-05-01 NOTE — ED ADULT NURSE NOTE - OBJECTIVE STATEMENT
Pt was BIBA for AMS and jaundice.  Pt is accompanied by his nephew who states that pt has hx of ETOH and DA and  has been declining for the past 2-3 weeks and had been refusing to go to the hospital.  Nephew states that he visited the pt today and found a few Suboxone wrappers and thinks pt might have taken too many.  Pt is A/ox1 with incoherent speech, denies pain and no pain behaviors noted.  IV inserted to RAC, labs sent.  IVF infusing without s/s redness or swelling noted.

## 2023-05-01 NOTE — H&P ADULT - PROBLEM SELECTOR PLAN 4
AMS possible multifactorial, hiv , aids ? extra dose of suboxone ? infectious process related ?   pt. using hiv meds ?   viral load requested.  ID consult  palliative consult  long term prognosis guarded.

## 2023-05-01 NOTE — H&P ADULT - PROBLEM SELECTOR PLAN 2
occult blood positive stool, PUD ?   will keep on iv ppi  bid  getting 2 units of prbc  GI follow up.

## 2023-05-01 NOTE — ED PROVIDER NOTE - ATTENDING CONTRIBUTION TO CARE
I, Adry Cisneros, have personally seen and examined this patient. I have fully participated in the care of this patient. I have reviewed all pertinent clinical information, including history, physical exam, plan and the Resident's note and agree except as noted below.     Pt with polysubstance abuse, progressive jaundice and AMS, noted to be febrile and tachycardic. abd distended and moderately tender. mild hypoxia without respiratory distress. labs with elevated lactate pan cytopenia, HIV + likely new diagnosis but patient unable to give further information. consented family for transfusion. no paracentesis as patient is coagulopathic and thrombocytopenic, has PNA and colitis on CT. given abx tx for sepsis. GI consult. protonix given. TBA

## 2023-05-01 NOTE — ED ADULT NURSE REASSESSMENT NOTE - NS ED NURSE REASSESS COMMENT FT1
assumed care for pt at 1930 from BLANCA Colin, charting as noted. pt resting in stretcher, pt appears jaundice, family member at bedside. ST on telemetry, oxygen saturation >92% on room air. pt is A&Ox1-2, is able to state his name and that he is in [facility], however it takes multiple times asking pt questions for him to give an answer. pt kept repeating that his nephew was looking for the car. family stated this is how he has been acting since he started taking too much medication at home. pt awaiting bed on unit. pt and family updated on plan of care.
MD Chase changed order set from Sepsis vital signs to routine. Order changed in the system and updated as per verbal order.

## 2023-05-01 NOTE — ED PROVIDER NOTE - PROGRESS NOTE DETAILS
Nancy Chase,  PGY1: Patient stable in ED course.  Labs/imaging noted.     Discussed with hospitalist - will admit.

## 2023-05-01 NOTE — H&P ADULT - NSHPPHYSICALEXAM_GEN_ALL_CORE
Vital Signs Last 24 Hrs  T(C): 36.8 (01 May 2023 22:08), Max: 38.1 (01 May 2023 16:10)  T(F): 98.2 (01 May 2023 22:08), Max: 100.5 (01 May 2023 16:10)  HR: 112 (01 May 2023 22:08) (106 - 115)  BP: 104/62 (01 May 2023 22:08) (93/56 - 128/73)  BP(mean): --  RR: 18 (01 May 2023 22:08) (18 - 20)  SpO2: 94% (01 May 2023 22:08) (91% - 95%)    Parameters below as of 01 May 2023 22:08  Patient On (Oxygen Delivery Method): nasal cannula  O2 Flow (L/min): 2    General: pt. in bed not in distress  eyes :  PERRL. intact EOM. scleral icterus  HEENT: AT, NC. no throat erythema or exudate.   Neck: supple. no JVD.   Chest: air entry fair bilaterally  Heart: S1,S2. RRR. no heart murmur. no edema.   Abdomen: soft. mild discomfort in lt. upper quadrant area, no RT, no guarding, mild distention, + BS.   rectal : deferred at this point, had one in the er, occult blood positive stool  Ext: no calf tenderness. moves all ext  vascular : distal pulses intact B/L  Neuro: Alert awake, oriented x 1, non focal. moves all ext. tries to answer questions but confused.  Skin: pt's whole body is jaundiced, no diaphoresis  psych : no restlessness , no si/hi

## 2023-05-01 NOTE — ED PROVIDER NOTE - CARE PLAN
Principal Discharge DX:	Sepsis  Secondary Diagnosis:	Colitis   1 Principal Discharge DX:	Sepsis  Secondary Diagnosis:	Colitis  Secondary Diagnosis:	HIV disease  Secondary Diagnosis:	Subacute liver failure

## 2023-05-01 NOTE — ED PROVIDER NOTE - OBJECTIVE STATEMENT
53 year old male with PMHx ETOH abuse (last drink 3 years ago), opiate abuse (last use 1 year ago, has been on suboxone) presenting for evaluation of 2 weeks of worsening jaundice, decreased PO, weight loss, confusion. Nephew at bedside providing history as patient is AOx1 (self) at this time. Nephew states he sees patient daily, was started on a number of medications 3 weeks ago which he thinks were psychiatric medications, then 2 weeks ago began to decompensate. Over last 3-4 days patient was no longer oriented to time or place. Patient lives alone, nephew went to see his apartment today and noted multiple packets of suboxone on the ground, is concerned patient is taking too much. No recent fevers, vomiting, abd pain.

## 2023-05-02 NOTE — CHART NOTE - NSCHARTNOTEFT_GEN_A_CORE
Contacted by RN for patient with tremors/shaking + fever and no PRN Tylenol.  Patient chart reviewed, AMS in setting of complex PMHx: HIV+, pancytopenia, neutropenic fever, GIB, possible early cirrhotic changes   patient seen bedside, no rigors, significant jaundice, redness noted over right lower occipital bone, + tachycardia.  Repeat vitals taken as prior set suggested Faget sign with (100.3T and HR 60 (no BB)).    Vital Signs  T(C): 100.9 rectally  HR: 110 manually obtained (55 on finger pulse ox)  BP: 100/54  RR: 18   SpO2: 99%     A/P: fever + tachycardia   650 PO Tylenol ordered, avoid additional doses as patient with evidence of cirrhosis + GIB/ profound pancytopenia   -if persistent fever suggest use of cooling blanket  Heme/onc reconsulted as service has yet to see patient today  CT head reviewed- no acute findings  discussed above with Attending  RN to monitor, assess and escalate to PA PRN.  Will continue to monitor.

## 2023-05-02 NOTE — CONSULT NOTE ADULT - NS ATTEND AMEND GEN_ALL_CORE FT
Patient seen and examined in the presence of the nurse practitioner Alexa.  Patient with HIV, substance abuse, presenting with pancytopenia, jaundice, coagulopathy,  encephalopathy related to substance use versus alcohol related encephalopathy.  There is no signs of overt GI bleeding.  Patient has no evidence of acute viral hepatitis.  HIV viral load is undetectable.  It is unclear why the patient has pancytopenia.  Does have elevated LDH which may be suggestive of a hemolytic process with the haptoglobin level is.  Suggest to consult infectious disease and hematology team.  Give vitamin K 10 mg IV once daily for the next 3 days and check INR on a daily basis.  Multivitamin, thiamine and folate supplementation.  Nutritional support. Prognosis is guarded.

## 2023-05-02 NOTE — CONSULT NOTE ADULT - SUBJECTIVE AND OBJECTIVE BOX
[facility]  INFECTIOUS DISEASES at [place] and [place]  =====================================================                               Ruben Perez MD*    Ailyn Wallace MD*                             Selvin Glez MD*     Leticia Landon MD*            Diplomates American Board of Internal Medicine & Infectious Diseases                * [place] Office - Appt - Tel  [phone] Fax [phone]                * [place] Office - Appt - Tel [phone] Fax [phone]                                  Hospital Consult line:  [phone]  =====================================================      [id number]  YONATHAN FUNK        CC: Patient is a 53y old  Male who presents with a chief complaint of colitis/ gi bleed/ pancytopenia (01 May 2023 21:34)      53y  Male     HPI:  pt. is a 53 year old male with PMHx , hiv , ETOH abuse (last drink 3 years ago), opiate abuse (last use 1 year ago, has been on suboxone) presenting for evaluation of 2 weeks of worsening jaundice, decreased PO, weight loss, confusion. pt's sister at bedside providing history as patient is AOx1 (self) at this time. Earlier pt's Nephew stated that he sees patient daily, was started on a number of medications 3 weeks ago which he thinks were psychiatric medications, then 2 weeks ago began to decompensate. Over last 3-4 days patient was no longer oriented to time or place. Patient lives alone, nephew went to see his apartment today and noted multiple packets of suboxone on the ground, is concerned patient is taking too much. No recent fevers, vomiting, abd pain. As per pt's sister pt. not taking his meds, hiv meds , pt. cannot verify if using his hiv meds. pt. is pancytopenic in the ER, occult blood positive in stool, Hb 6.7 , getting 2 units of prbc.  (01 May 2023 21:34)    ______________________________________________________  PAST MEDICAL & SURGICAL HISTORY:  Rotator cuff tear, right      Knee pain, right      Lumbar pain  Pt states that he is having an AXEL on 7/30      Nose pain      Alcohol abuse      Heroin abuse      HIV disease      S/P shoulder surgery  right 2015          Social History:    Occupation:  Travel:  Pets:  Drugs:  Alcohol:     FAMILY HISTORY:  Family history of hypertension (Mother)        ______________________________________________________  Allergies    No Known Allergies    Intolerances        ______________________________________________________  MEDICATIONS:  Antibiotics:  ertapenem  IVPB      ertapenem  IVPB 1000 milliGRAM(s) IV Intermittent every 24 hours    Other medications:  pantoprazole  Injectable 40 milliGRAM(s) IV Push every 12 hours    ______________________________________________________  REVIEW OF SYSTEMS:  CONSTITUTIONAL:  No fever or chills  HEENT:  No diplopia or blurred vision.  No earache, sore throat or runny nose.  CARDIOVASCULAR:  No chest pain  RESPIRATORY:  No cough, shortness of breath  GASTROINTESTINAL:  No nausea, vomiting, abdominal pain or diarrhea.  GENITOURINARY:  No dysuria, frequency or urgency. No blood in urine  MUSCULOSKELETAL:  no joint aches, no muscle pain  SKIN:  No change in skin, hair or nails.  NEUROLOGIC:  No headaches, seizures  PSYCHIATRIC:  No disorder of thought or mood.  ENDOCRINE:  No heat or cold intolerance  HEMATOLOGICAL:  No easy bruising or bleeding.     _____________________________________________________  PHYSICAL EXAM:  GEN: awake, alert, oriented x 3. Lying comfortable in bed, in no acute distress   HEENT: normocephalic and atraumatic. EOMI. PERRL.  Anicteric sclerae. Moist mucous membranes. No mucosal lesions. No nasal discharge.   NECK: Supple. No palpable neck masses or LN  LUNGS: eupneic, CTA B/L, no adventitious sounds  HEART: RRR, no m/r/g  ABDOMEN: Soft, NT, ND, no hepatosplenomegally, no palpable masses.  +BS.    : No CVA tenderness, no Kirk catheter  EXTREMITIES: well perfused, without  edema.  MSK: No joint deformity or swelling  LYMPH: no palpable cervical, supraclavicular, axillary or inguinal lymph nodes  NEUROLOGIC: Grossly no focal deficits   PSYCHIATRIC: Appropriate affect and mood.  SKIN: No rash, wounds or jaundice  LINES: no central lines, only peripheral access c/d/i    ______________________________________________________    Weight (kg): 65.8 (05-01 @ 13:40)    Vitals:  T(F): 100 (02 May 2023 08:27), Max: 100.5 (01 May 2023 16:10)  HR: 130 (02 May 2023 08:27)  BP: 111/65 (02 May 2023 08:27)  RR: 18 (02 May 2023 08:27)  SpO2: 91% (02 May 2023 08:27) (91% - 95%)  temp max in last 48H T(F): , Max: 100.5 (05-01-23 @ 16:10)    Current Antibiotics:  ertapenem  IVPB      ertapenem  IVPB 1000 milliGRAM(s) IV Intermittent every 24 hours    Other medications:  pantoprazole  Injectable 40 milliGRAM(s) IV Push every 12 hours                            7.7    0.30  )-----------( 16       ( 02 May 2023 02:58 )             23.0     05-02    136  |  99  |  43.6<H>  ----------------------------<  105<H>  4.3   |  23.0  |  1.01    Ca    7.7<L>      02 May 2023 02:58    TPro  4.1<L>  /  Alb  1.9<L>  /  TBili  20.5<H>  /  DBili  >10.0<H>  /  AST  107<H>  /  ALT  73<H>  /  AlkPhos  571<H>  05-02    RECENT CULTURES:  05-01 @ 16:44          NotDete      WBC Count: 0.30 K/uL (05-02-23 @ 02:58)  WBC Count: 0.94 K/uL (05-01-23 @ 14:55)    Creatinine, Serum: 1.01 mg/dL (05-02-23 @ 02:58)  Creatinine, Serum: 1.14 mg/dL (05-01-23 @ 14:55)           SARS-CoV-2: [facility] (05-01-23 @ 16:44)    ______________________________________________________  RADIOLOGY [facility]  INFECTIOUS DISEASES at [place] and [place]  =====================================================                               Ruben Perez MD*    Ailyn Wallace MD*                             Selvin Glez MD*     Leticia Landon MD*            Diplomates American Board of Internal Medicine & Infectious Diseases                * [place] Office - Appt - Tel  [phone] Fax [phone]                * [place] Office - Appt - Tel [phone] Fax [phone]                                  Hospital Consult line:  [phone]  =====================================================      [id number]  YONATHAN FUNK        CC: Patient is a 53y old  Male who presents with a chief complaint of colitis/ gi bleed/ pancytopenia (01 May 2023 21:34)    HPI: pt. is a 53 year old male with PMHx , hiv , ETOH abuse (last drink 3 years ago), opiate abuse (last use 1 year ago, has been on suboxone) presenting for evaluation of 2 weeks of worsening jaundice, decreased PO, weight loss, confusion. pt's sister at bedside providing history as patient is AOx1 (self) at this time. Earlier pt's Nephew stated that he sees patient daily, was started on a number of medications 3 weeks ago which he thinks were psychiatric medications, then 2 weeks ago began to decompensate. Over last 3-4 days patient was no longer oriented to time or place. Patient lives alone, nephew went to see his apartment today and noted multiple packets of suboxone on the ground, is concerned patient is taking too much. No recent fevers, vomiting, abd pain. As per pt's sister pt. not taking his meds, hiv meds , pt. cannot verify if using his hiv meds. pt. is pancytopenic in the ER, occult blood positive in stool, Hb 6.7 , getting 2 units of prbc.  (01 May 2023 21:34)    52 y/o M with history of HIV, depression, substance and alcohol abuse. History is very limited, as family and patient are unable to provide much information. Per sister, patient lives alone in a shared apartment; he was last seen in his usual state of health about 2-3 weeks ago. It appears he was started on a new medication for depression, but his sister trashed all the prescription bottles in the garbage; she is unable to recall names or prescriber. Unknown HIV treatment, compliance or history of opportunistic infections. Family thinks he follows at [facility].     Unable to obtain meaningful information from patient.   ______________________________________________________  PAST MEDICAL & SURGICAL HISTORY:  Rotator cuff tear, s/p repair     Knee pain, right    Lumbar pain      Alcohol abuse    Heroin abuse    HIV      Social History:  Unable to obtain due to medical condition - altered mental status     FAMILY HISTORY:  Family history of hypertension (Mother)        ______________________________________________________  Allergies    No Known Allergies    Intolerances        ______________________________________________________  MEDICATIONS:  Antibiotics:  ertapenem  IVPB      ertapenem  IVPB 1000 milliGRAM(s) IV Intermittent every 24 hours    Other medications:  pantoprazole  Injectable 40 milliGRAM(s) IV Push every 12 hours    ______________________________________________________  REVIEW OF SYSTEMS:  Unable to obtain due to medical condition - altered mental status n    _____________________________________________________  PHYSICAL EXAM:  GEN: ill appearing, cachectic   HEENT: normocephalic and atraumatic. . PERRL.  Icteric sclerae. Dry mucous membranes. +Thrush   NECK: Supple. No palpable neck masses or LN  LUNGS: eupneic, CTA B/L, no adventitious sounds  HEART: RRR, no m/r/g  ABDOMEN: Soft, NT, ND, no palpable masses.  +BS.    : no Kirk catheter  EXTREMITIES: well perfused, without  edema.  MSK: No joint deformity or swelling  LYMPH: no palpable cervical, supraclavicular, axillary or inguinal lymph nodes  NEUROLOGIC: moving all extremities. Awake, tangential conversation. Unable to answer appropriately to questions.   SKIN: Marked jaundice. No rash   LINES: no central lines, only peripheral access c/d/i    ______________________________________________________    Weight (kg): 65.8 (05-01 @ 13:40)    Vitals:  T(F): 100 (02 May 2023 08:27), Max: 100.5 (01 May 2023 16:10)  HR: 130 (02 May 2023 08:27)  BP: 111/65 (02 May 2023 08:27)  RR: 18 (02 May 2023 08:27)  SpO2: 91% (02 May 2023 08:27) (91% - 95%)  temp max in last 48H T(F): , Max: 100.5 (05-01-23 @ 16:10)    Current Antibiotics:  ertapenem  IVPB      ertapenem  IVPB 1000 milliGRAM(s) IV Intermittent every 24 hours    Other medications:  pantoprazole  Injectable 40 milliGRAM(s) IV Push every 12 hours                            7.7    0.30  )-----------( 16       ( 02 May 2023 02:58 )             23.0     05-02    136  |  99  |  43.6<H>  ----------------------------<  105<H>  4.3   |  23.0  |  1.01    Ca    7.7<L>      02 May 2023 02:58    TPro  4.1<L>  /  Alb  1.9<L>  /  TBili  20.5<H>  /  DBili  >10.0<H>  /  AST  107<H>  /  ALT  73<H>  /  AlkPhos  571<H>  05-02    RECENT CULTURES:  05-01 @ 16:44    RVP with SARS-CoV-2   NotDete      WBC Count: 0.30 K/uL (05-02-23 @ 02:58)  WBC Count: 0.94 K/uL (05-01-23 @ 14:55)    Creatinine, Serum: 1.01 mg/dL (05-02-23 @ 02:58)  Creatinine, Serum: 1.14 mg/dL (05-01-23 @ 14:55)     SARS-CoV-2: NotDetec (05-01-23 @ 16:44)    ______________________________________________________  RADIOLOGY  < from: US Abdomen Upper Quadrant Right (05.02.23 @ 09:01) >  FINDINGS:  Liver: Enlarged (19.5 cm) with slightly heterogeneous echotexture and   mildly nodular contour possible cirrhotic change.  Bile ducts: Normal caliber. Common bile duct measures 2 mm.  Gallbladder: No gallstones. Diffuse nonspecific wall thickening likely   reactive in this setting.  Pancreas: Not adequately visualized.  Right kidney: 11.8 cm. No hydronephrosis.  Ascites: Small right upper quadrant ascites  IVC: Visualized portions are within normal limits.    IMPRESSION:  Hepatomegaly with  possible early cirrhotic change.  No biliary dilatation.  Mild ascites.    < end of copied text >    < from: CT Chest w/ IV Cont (05.01.23 @ 17:45) >  FINDINGS:    CHEST:    LUNGS AND LARGE AIRWAYS:  PLEURA:  There are bilateral micronodular, tree-in-bud nodular opacities upper and   lower lung zones compatible with infectious/inflammatory small airway   disease.  There are mild dependent subsegmental atelectatic changes right lower   lobe.  No lobar lung consolidation, pleural effusion or pneumothorax.    The central airways are patent.    VESSELS: Within normal limits.    HEART: Heart size is normal. No pericardial effusion.    MEDIASTINUM AND NINFA:   No lymphadenopathy.  Diffuse esophageal wall thickening.    CHEST WALL AND LOWER NECK: Within normal limits.    ABDOMEN AND PELVIS:    LIVER: Within normal limits.  BILE DUCTS: Normal caliber.  GALLBLADDER: Edematous gallbladder wall.  SPLEEN:  Splenomegaly, 16 cm.  Approximately 2 cm wedge-shaped decreased attenuation peripherally lower   spleen extending to the capsule, suggestive of splenic infarct.  PANCREAS: Within normal limits.  ADRENALS: Within normal limits.  KIDNEYS/URETERS: Within normal limits.    BLADDER: Moderately distended.  REPRODUCTIVE ORGANS: Prostate not enlarged.    BOWEL:   PERITONEUM:  Evaluation of the stomach is limited without distention.    Bowel wall thickening with submucosal edema and mucosal hyperenhancement   right colon, hepatic flexure through cecum.  No bowel obstruction.  There is a moderate amount volume of abdominal and pelvic ascites.    VESSELS:  Subgastric and perisplenic collateral vessels.  RETROPERITONEUM/LYMPH NODES:  Prominent bilateral common iliac chain lymphadenopathy measuring up to   2.4 cm short axis on the right.  Bilateral external iliac chain lymphadenopathy, measuring up to 2 cm   short axis on the right.    ABDOMINAL WALL: Within normal limits.    BONES: Degenerative changes.    IMPRESSION:    Bilateral micronodular and tree-in-bud nodular opacities suggestive of   infectious/inflammatory small airway disease.    Portal hypertension, including splenomegaly and suggestion of splenic   infarct inferior pole.    Moderate volume of abdominal pelvic ascites.    Right-sided colitis, may be associated with portal colopathy versus   infectious or ischemic colitis.    Retroperitoneal/pelvic lymphadenopathy.    Other findings as discussed above.    < end of copied text >    < from: CT Head No Cont (05.01.23 @ 17:42) >    INTERPRETATION:  CLINICAL INDICATION: Altered mental status    TECHNIQUE: Axial CT scanning of the brain was obtained from the skull   base to the vertex without the administration of intravenous contrast.   Reformatted coronal and sagittal images were subsequently obtained and   reviewed.    COMPARISON: None    FINDINGS:  There is no CT evidence of acute transcortical infarct.    There is no hydrocephalus, mass effect, or acute intracranial hemorrhage.   No extra-axial collection. Basal cisterns are patent.    The visualized paranasal sinuses and mastoid air cells are clear.    The calvarium is intact.    IMPRESSION:  No evidence of acute transcortical infarct, acute intracranial   hemorrhage, or mass effect.    < end of copied text >

## 2023-05-02 NOTE — CHART NOTE - NSCHARTNOTEFT_GEN_A_CORE
53 year old man with history of HIV on treatment admitted for altered mental status. Labs reveal severe pancytopenia, elevated t.bili, elevated LDH, elevated ferritin. Imaging shows hepatomegaly and splenomegaly with evidence of early cirrhosis. Differential includes infection, acute liver failure, malignancy , or primary bone marrow process. HLH is a consideration.   - Please perform BM biopsy ASAP  - please trend reticulocyte count, CBC, ferritin, fibrinogen, haptoglobin, PT/PTT daily for now to  - Given history of HIV, pancytopenia, splenomegaly, HLH is a consideration and will send off soluble CD25, and NK Cell activity.    - full note to follow. 53 year old man with history of HIV on treatment admitted for altered mental status. Labs reveal severe pancytopenia, elevated t.bili, elevated LDH, elevated ferritin. Imaging shows hepatomegaly and splenomegaly with evidence of early cirrhosis. Peripheral smear examined, which showed pancytopenia. WBCs were not dysplastic appearing. No nucleated RBCs were present. There were no schistocytes.  Differential includes infection, acute liver failure, malignancy , or primary bone marrow process. HLH is a consideration.   - Please perform BM biopsy ASAP  - please trend reticulocyte count, CBC, ferritin, fibrinogen, haptoglobin, PT/PTT daily for now to  - Given history of HIV, pancytopenia, splenomegaly, HLH is a consideration and will send off soluble CD25, and NK Cell activity.    - full note to follow.

## 2023-05-02 NOTE — CHART NOTE - NSCHARTNOTEFT_GEN_A_CORE
Interval Hx: Called by RN for patient with BP 88/43 and 87/44. Received sign-out from daytime medicine PA for hematology consult requesting bone marrow bx in AM.   Patient is a 53 year old male with PMH of HIV, ETOH use disorder, and Opiate use disorder, admitted for pancytopenia, febrile neutropenia, anemia 2/2 GI bleed.  Patient is seen at bedside without any complaints. Denies dizziness or lightheadedness.     Vitals  T(C): 36.5 (05-02-23 @ 21:25), Max: 38.3 (05-02-23 @ 17:30)  HR: 92 (05-02-23 @ 21:25) (60 - 130)  BP: 91/50 (05-02-23 @ 21:25) (88/47 - 111/65)  RR: 18 (05-02-23 @ 21:25) (18 - 19)  SpO2: 93% (05-02-23 @ 21:25) (91% - 100%) on RA    Physical Exam  CONSTITUTIONAL: Patient laying in bed, confused, no apparent distress  EYES: PERRLA and symmetric, EOMI, No conjunctival or scleral injection, non-icteric  ENMT: Oral mucosa with moist membranes. Normal dentition; no pharyngeal injection or exudates             NECK: Supple, symmetric and without tracheal deviation   RESP: No respiratory distress, no use of accessory muscles; CTA b/l, no WRR  CV: RRR, +S1S2, no MRG; no JVD; no peripheral edema  GI: Soft, NT, ND, no rebound, no guarding; no palpable masses; no hepatosplenomegaly; no hernia palpated  LYMPH: No cervical LAD or tenderness; no axillary LAD or tenderness; no inguinal LAD or tenderness  MSK: Normal gait; No digital clubbing or cyanosis; examination of the (head/neck/spine/ribs/pelvis, RUE, LUE, RLE, LLE) without misalignment,            Normal ROM without pain, no spinal tenderness, normal muscle strength/tone  SKIN: No rashes or ulcers noted; no subcutaneous nodules or induration palpable  NEURO: CN II-XII intact; normal reflexes in upper and lower extremities, sensation intact in upper and lower extremities b/l to light touch   PSYCH: Appropriate insight/judgment; A+O x 3, mood and affect appropriate, recent/remote memory intact Interval Hx: Called by RN for patient with BP 88/43 and 87/44. Received sign-out from daytime medicine PA for hematology consult requesting bone marrow bx in AM.   Patient is a 53 year old male with PMH of HIV, ETOH use disorder, and Opiate use disorder, admitted for pancytopenia, febrile neutropenia, anemia 2/2 GI bleed.  Patient is seen at bedside without any complaints. Denies dizziness or lightheadedness.     Vitals  T(C): 36.5 (05-02-23 @ 21:25), Max: 38.3 (05-02-23 @ 17:30)  HR: 92 (05-02-23 @ 21:25) (60 - 130)  BP: 91/50 (05-02-23 @ 21:25) (88/47 - 111/65)  RR: 18 (05-02-23 @ 21:25) (18 - 19)  SpO2: 93% (05-02-23 @ 21:25) (91% - 100%) on RA    Physical Exam  CONSTITUTIONAL: Patient laying in bed, confused, no apparent distress  EYES: +scleral icterus b/l  RESP: No respiratory distress, no use of accessory muscles; CTA b/l, no WRR  CV: RRR, +S1S2  GI: Softly distended, non-tender to palpation, no rebound tenderness, no guarding  Skin: +diffuse jaundice   NEURO: A&O x2-3    Labs             7.7    0.30  )-----------( 16       ( 05-02-23 @ 02:58 )             23.0     136  |  99  |  43.6  -------------------------<  105   05-02-23 @ 02:58  4.3  |  23.0  |  1.01    Ca      7.7     05-02-23 @ 02:58    TPro  4.1  /  Alb  1.9  /  TBili  20.5  /  DBili  >10.0  /  AST  107  /  ALT  73  /  AlkPhos  571  /  GGT  x     05-02-23 @ 02:58    PT/INR - ( 05-02-23 @ 02:58 )   PT: 36.5 sec<H>;   INR: 3.11 ratio<H>  PTT - ( 05-02-23 @ 02:58 )  PTT:49.1 sec      A/P: 53 year old male with PMH of HIV, ETOH use disorder, and Opiate use disorder, admitted for pancytopenia, febrile neutropenia, anemia 2/2 GI bleed, seen today for hypotension.     Hypotension  -albumin 25% 50cc given   -repeat BP 91/50  -asymptomatic    Oxygen requirements   -SpO2 low 90s  -2L NC PRN     Discussed with RN   Will continue to monitor   RN to notify provider with any changes in patient status.

## 2023-05-02 NOTE — CONSULT NOTE ADULT - ASSESSMENT
52 y/o M with history of HIV, depression, substance and alcohol abuse. History is very limited, as family and patient are unable to provide much information. Per sister, patient lives alone in a shared apartment; he was last seen in his usual state of health about 2-3 weeks ago. It appears he was started on a new medication for depression, but his sister trashed all the prescription bottles in the garbage; she is unable to recall names or prescriber. Unknown HIV treatment, compliance or history of opportunistic infections. Family thinks he follows at [facility].  54 y/o M with history of HIV, depression, substance and alcohol abuse. History is very limited, as family and patient are unable to provide much information. Per sister, patient lives alone in a shared apartment; he was last seen in his usual state of health about 2-3 weeks ago. It appears he was started on a new medication for depression, but his sister trashed all the prescription bottles in the garbage; she is unable to recall names or prescriber. Unknown HIV treatment, compliance or history of opportunistic infections (followed at [facility]?)      - Profound pancytopenia, marked hyperbilirubinemia and elevated ferritin to 17K  - Differential diagnosis is broad is this patient with AIDS - drug-induced, advanced dz, HLH, disseminated mycobacterial infection, histoplasmosis, viral coinfection  - Follow hepatitis panel (drawn pending)  - Follow HIV viral load (drawn/ pending)  - Check mycobacterial blood culture (ordered for AM)  - Check AFB urine culture   - Check Parvovirus B12 PCR  - Check EBV PCR  - Check CMV PCR  - Check histoplasma urine AG   - Check toxoplasma AB  - Check Fungitell and AGM  - May need BM Bx  - UDS + THC   - Started on empiric ertapenem; would recommend empiric cefepime instead pending bcx culture results   - Patient with oral thrush; he probably has esophageal candidiasis given advanced AIDS   - Unable to contact [facility] for more information   - Prognosis guarded  - Airborne precautions     D/w Dr. Danielle and CDU RN  52 y/o M with history of HIV, depression, substance and alcohol abuse. History is very limited, as family and patient are unable to provide much information. Per sister, patient lives alone in a shared apartment; he was last seen in his usual state of health about 2-3 weeks ago. It appears he was started on a new medication for depression, but his sister trashed all the prescription bottles in the garbage; she is unable to recall names or prescriber. Unknown HIV treatment, compliance or history of opportunistic infections (followed at [facility]?)      - Profound pancytopenia, marked hyperbilirubinemia and elevated ferritin to 17K  - Differential diagnosis is broad is this patient with AIDS - drug-induced, advanced dz, HLH, disseminated mycobacterial infection, histoplasmosis, viral coinfection  - Follow hepatitis panel (drawn pending)  - Follow HIV viral load (drawn/ pending)  - Check mycobacterial blood culture (ordered for AM)  - Check AFB urine culture   - Check Parvovirus B12 PCR  - Check EBV PCR  - Check CMV PCR  - Check histoplasma urine AG   - Check toxoplasma AB  - Check Fungitell and AGM  - Check GI PCR   - Check Stool Cx  - Check O&P  - May need BM Bx  - UDS + THC   - Started on empiric ertapenem for suspected colitis reported on CT   - Patient with oral thrush; he probably has esophageal candidiasis given advanced AIDS   - Unable to contact [facility] for more information   - Prognosis guarded  - Airborne precautions     D/w Dr. Danielle and CDU RN  52 y/o M with history of HIV, depression, substance and alcohol abuse. History is very limited, as family and patient are unable to provide much information. Per sister, patient lives alone in a shared apartment; he was last seen in his usual state of health about 2-3 weeks ago. It appears he was started on a new medication for depression, but his sister trashed all the prescription bottles in the garbage; she is unable to recall names or prescriber. Unknown HIV treatment, compliance or history of opportunistic infections (followed at [facility]?)      - Profound pancytopenia, marked hyperbilirubinemia and elevated ferritin to 17K  - Differential diagnosis is broad is this patient with AIDS - drug-induced, advanced dz, HLH, disseminated mycobacterial infection, histoplasmosis, viral coinfection  - Follow hepatitis panel (drawn pending)  - Follow HIV viral load (drawn/ pending)  - Check mycobacterial blood culture  - Check AFB urine culture   - Check Parvovirus B12 PCR  - Check EBV PCR  - Check CMV PCR  - Check histoplasma urine AG   - Check toxoplasma AB  - Check Fungitell and AGM  - Check GI PCR   - Check Stool Cx, O&P, stool AFB  - May need BM Bx  - UDS + THC   - Started on empiric ertapenem for suspected colitis reported on CT   - Patient with oral thrush; he probably has esophageal candidiasis given advanced AIDS   - Unable to contact [facility] for more information   - Prognosis guarded  - Airborne precautions     D/w Dr. Danielle and CDU RN  52 y/o M with history of HIV, depression, substance and alcohol abuse. History is very limited, as family and patient are unable to provide much information. Per sister, patient lives alone in a shared apartment; he was last seen in his usual state of health about 2-3 weeks ago. It appears he was started on a new medication for depression, but his sister trashed all the prescription bottles in the garbage; she is unable to recall names or prescriber. Unknown HIV treatment, compliance or history of opportunistic infections (followed at [facility]?)      - Profound pancytopenia, marked hyperbilirubinemia and elevated ferritin to 17K  - Differential diagnosis is broad is this patient with AIDS - drug-induced, advanced dz, HLH, disseminated mycobacterial infection, histoplasmosis, viral coinfection  - Follow hepatitis panel (drawn pending)  - Follow HIV viral load (drawn/ pending)  - Check mycobacterial blood culture  - Check AFB urine culture   - Check Parvovirus B12 PCR  - Check EBV PCR  - Check CMV PCR  - Check histoplasma urine AG   - Check toxoplasma AB  - Check Fungitell and AGM  - Check syphilis screen   - Check crypto serum AG   - Check GI PCR   - Check Stool Cx, O&P, stool AFB  - May need BM Bx  - UDS + THC   - Started on empiric ertapenem for suspected colitis reported on CT   - Patient with oral thrush; he probably has esophageal candidiasis given advanced AIDS   - Unable to contact [facility] for more information   - Prognosis guarded  - Airborne precautions     D/w Dr. Danielle and CDU BLANCA  52 y/o M with history of HIV, depression, substance and alcohol abuse. History is very limited, as family and patient are unable to provide much information. Per sister, patient lives alone in a shared apartment; he was last seen in his usual state of health about 2-3 weeks ago. It appears he was started on a new medication for depression, but his sister trashed all the prescription bottles in the garbage; she is unable to recall names or prescriber. Unknown HIV treatment, compliance or history of opportunistic infections (followed at [facility]?)      - Profound pancytopenia, marked hyperbilirubinemia and elevated ferritin to 17K  - Differential diagnosis is broad is this patient with AIDS - drug-induced, advanced dz, HLH, disseminated mycobacterial infection, histoplasmosis, viral coinfection  - Follow hepatitis panel (drawn pending)  - Follow HIV viral load (drawn/ pending)  - Check mycobacterial blood culture  - Check AFB urine culture   - Check Parvovirus B12 PCR  - Check EBV PCR  - Check CMV PCR  - Check histoplasma urine AG   - Check toxoplasma AB  - Check Fungitell and AGM  - Check syphilis screen   - Check crypto serum AG   - Check GI PCR   - Check Stool Cx, O&P, stool AFB  - If able, induced AFB sputum x 3   - May need BM Bx  - UDS + THC   - Started on empiric ertapenem for suspected colitis reported on CT   - Patient with oral thrush; he probably has esophageal candidiasis given advanced AIDS   - Unable to contact [facility] for more information   - Prognosis guarded  - Airborne precautions     D/w Dr. Danielle and GIAN RN

## 2023-05-02 NOTE — PROGRESS NOTE ADULT - SUBJECTIVE AND OBJECTIVE BOX
seen for multiorgan failure    seen with   complaining of weakness  poor historian  ros negative     MEDICATIONS  (STANDING):  buprenorphine 8 mG/naloxone 2 mG SL Film 1 Film(s) SubLingual three times a day  ertapenem  IVPB      ertapenem  IVPB 1000 milliGRAM(s) IV Intermittent every 24 hours  nystatin    Suspension 711508 Unit(s) Oral four times a day  pantoprazole  Injectable 40 milliGRAM(s) IV Push every 12 hours  phytonadione   Solution 10 milliGRAM(s) Oral daily  sodium chloride 0.9%. 1000 milliLiter(s) (125 mL/Hr) IV Continuous <Continuous>    MEDICATIONS  (PRN):      Allergies    No Known Allergies      Vital Signs Last 24 Hrs  T(C): 37.7 (02 May 2023 12:49), Max: 38.1 (01 May 2023 16:10)  T(F): 99.8 (02 May 2023 12:49), Max: 100.5 (01 May 2023 16:10)  HR: 115 (02 May 2023 12:49) (106 - 130)  BP: 98/59 (02 May 2023 12:49) (93/56 - 111/69)  BP(mean): --  RR: 18 (02 May 2023 12:49) (18 - 20)  SpO2: 91% (02 May 2023 12:49) (91% - 95%)    Parameters below as of 02 May 2023 12:49  Patient On (Oxygen Delivery Method): nasal cannula  O2 Flow (L/min): 2      PHYSICAL EXAM:    GENERAL: jaundiced, chronically ill appearing   CHEST/LUNG: Clear to ausculation bilaterally  HEART: Regular rate and rhythm; S1 S2  ABDOMEN: Soft,  Bowel sounds present  EXTREMITIES:  no edema   NERVOUS SYSTEM:  Alert & Oriented X2-3  poor historian. tremulous  nonfocal    LABS:                        7.7    0.30  )-----------( 16       ( 02 May 2023 02:58 )             23.0     05-02    136  |  99  |  43.6<H>  ----------------------------<  105<H>  4.3   |  23.0  |  1.01    Ca    7.7<L>      02 May 2023 02:58    TPro  4.1<L>  /  Alb  1.9<L>  /  TBili  20.5<H>  /  DBili  >10.0<H>  /  AST  107<H>  /  ALT  73<H>  /  AlkPhos  571<H>  05-    PT/INR - ( 02 May 2023 02:58 )   PT: 36.5 sec;   INR: 3.11 ratio         PTT - ( 02 May 2023 02:58 )  PTT:49.1 sec  Urinalysis Basic - ( 01 May 2023 19:03 )    Color: Yellow / Appearance: Clear / S.010 / pH: x  Gluc: x / Ketone: Negative  / Bili: Large / Urobili: 12 mg/dL   Blood: x / Protein: Negative / Nitrite: Negative   Leuk Esterase: Trace / RBC: 3-5 /HPF / WBC 3-5 /HPF   Sq Epi: x / Non Sq Epi: x / Bacteria: Few        CAPILLARY BLOOD GLUCOSE            RADIOLOGY & ADDITIONAL TESTS:

## 2023-05-02 NOTE — CONSULT NOTE ADULT - SUBJECTIVE AND OBJECTIVE BOX
Patient is a 53y old  Male who presents with a chief complaint of colitis/ gi bleed/ pancytopenia (02 May 2023 11:44)      HPI:  pt. is a 53 year old male with PMHx , HIV, ETOH abuse (last drink 3 years ago), opiate abuse (last use 1 year ago, has been on suboxone) presenting for evaluation of 2 weeks of worsening jaundice, decreased PO, weight loss, confusion. pt's sister at bedside providing history as patient is AOx1 (self). Earlier pt's Nephew stated that he sees patient daily, was started on a number of medications 3 weeks ago which he thinks were psychiatric medications, then 2 weeks ago began to decompensate. Over last 3-4 days patient was no longer oriented to time or place. Patient lives alone, nephew went to see his apartment today and noted multiple packets of suboxone on the ground, is concerned patient is taking too much. No recent fevers, vomiting, abd pain. As per pt's sister pt. not taking his meds, hiv meds , pt. cannot verify if using his hiv meds. pt. is pancytopenic in the ER, occult blood positive in stool, Hb 6.7 , getting 2 units of prbc.  At this time, Patient unable to provide any hx. A &OX 0. Able to follow commands.     Labs remarkable for Pancytopenia. Hgb 7.7gm, WBC 0.98, Plt 16K. INR 3.11. Alk Phos 571, AST//73. Tbili 20.5. US of abdomen shows Hepatomegaly with  possible early cirrhotic change. No biliary dilatation. Mild ascites. CT of abdomen and pelvis revealed Portal hypertension, Right-sided colitis, may be associated with portal colopathy versus infectious or ischemic colitis.          PAST MEDICAL & SURGICAL HISTORY:  Rotator cuff tear, right      Knee pain, right      Lumbar pain  Pt states that he is having an AXEL on       Nose pain      Alcohol abuse      Heroin abuse      HIV disease      S/P shoulder surgery  right           Allergies    No Known Allergies    Intolerances        MEDICATIONS  (STANDING):  ertapenem  IVPB      ertapenem  IVPB 1000 milliGRAM(s) IV Intermittent every 24 hours  pantoprazole  Injectable 40 milliGRAM(s) IV Push every 12 hours    MEDICATIONS  (PRN):      Social History:    Marital Status:  (   )    (   ) Single    (   )    (  )   Occupation:   Lives with: (  ) alone  (  ) children   (  ) spouse   (  ) parents  (  ) other    Substance Use (street drugs): (  ) never used  (  ) other:  Tobacco Usage:  (   ) never smoked   (   ) former smoker   (   ) current smoker  (     ) pack year  (        ) last cigarette date  Alcohol Usage: ETOH abuse   Sexual History:     Family History   IBD (  ) Yes   (  ) No  GI Malignancy (  )  Yes    (  ) No    Health Management     Last Colonoscopy -      (     ) Advanced Directives: (     ) None    (      ) DNR    (     ) DNI    (     ) Health Care Proxy:       REVIEW OF SYSTEMS:   Unable to obtain due to mental status.      Vital Signs Last 24 Hrs  T(C): 37.8 (02 May 2023 08:27), Max: 38.1 (01 May 2023 16:10)  T(F): 100 (02 May 2023 08:27), Max: 100.5 (01 May 2023 16:10)  HR: 130 (02 May 2023 08:27) (106 - 130)  BP: 111/65 (02 May 2023 08:27) (93/56 - 128/73)  BP(mean): --  RR: 18 (02 May 2023 08:27) (18 - 20)  SpO2: 91% (02 May 2023 08:27) (91% - 95%)    Parameters below as of 02 May 2023 08:27  Patient On (Oxygen Delivery Method): nasal cannula  O2 Flow (L/min): 2      PHYSICAL EXAM:   GENERAL:  No acute distress  HEENT:  NC/AT, conjunctiva clear, sclera icteric  CHEST:  No increased effort  HEART:  Regular rhythm  ABDOMEN:  Soft, non-tender, non-distended, normoactive bowel sounds  EXTREMITIES: Jaundice, No edema  SKIN:  Warm, dry  NEURO:  Calm, A&Ox0.         LABS:                        7.7    0.30  )-----------( 16       ( 02 May 2023 02:58 )             23.0     05-02    136  |  99  |  43.6<H>  ----------------------------<  105<H>  4.3   |  23.0  |  1.01    Ca    7.7<L>      02 May 2023 02:58    TPro  4.1<L>  /  Alb  1.9<L>  /  TBili  20.5<H>  /  DBili  >10.0<H>  /  AST  107<H>  /  ALT  73<H>  /  AlkPhos  571<H>      PT/INR - ( 02 May 2023 02:58 )   PT: 36.5 sec;   INR: 3.11 ratio         PTT - ( 02 May 2023 02:58 )  PTT:49.1 sec  Urinalysis Basic - ( 01 May 2023 19:03 )    Color: Yellow / Appearance: Clear / S.010 / pH: x  Gluc: x / Ketone: Negative  / Bili: Large / Urobili: 12 mg/dL   Blood: x / Protein: Negative / Nitrite: Negative   Leuk Esterase: Trace / RBC: 3-5 /HPF / WBC 3-5 /HPF   Sq Epi: x / Non Sq Epi: x / Bacteria: Few      LIVER FUNCTIONS - ( 02 May 2023 02:58 )  Alb: 1.9 g/dL / Pro: 4.1 g/dL / ALK PHOS: 571 U/L / ALT: 73 U/L / AST: 107 U/L / GGT: x             RADIOLOGY & ADDITIONAL TESTS:      < from: US Abdomen Upper Quadrant Right (23 @ 09:01) >    ACC: [id number] EXAM:  US ABDOMEN RT UPR QUADRANT   ORDERED BY: VIDYA RODRIGUES     PROCEDURE DATE:  2023          FINDINGS:  Liver: Enlarged (19.5 cm) with slightly heterogeneous echotexture and   mildly nodular contour possible cirrhotic change.  Bile ducts: Normal caliber. Common bile duct measures 2 mm.  Gallbladder: No gallstones. Diffuse nonspecific wall thickening likely   reactive in this setting.  Pancreas: Not adequately visualized.  Right kidney: 11.8 cm. No hydronephrosis.  Ascites: Small right upper quadrant ascites  IVC: Visualized portions are within normal limits.    IMPRESSION:  Hepatomegaly with  possible early cirrhotic change.  No biliary dilatation.  Mild ascites.      --- End of Report ---        < end of copied text >        ACC: [id number] EXAM:  CT ABDOMEN AND PELVIS IC   ORDERED BY: CHRIS DOWNEY     ACC: [id number] EXAM:  CT CHEST IC   ORDERED BY: CHRIS DOWNEY     PROCEDURE DATE:  2023          FINDINGS:    CHEST:    LUNGS AND LARGE AIRWAYS:  PLEURA:  There are bilateral micronodular, tree-in-bud nodular opacities upper and   lower lung zones compatible with infectious/inflammatory small airway   disease.  There are mild dependent subsegmental atelectatic changes right lower   lobe.  No lobar lung consolidation, pleural effusion or pneumothorax.    The central airways are patent.    VESSELS: Within normal limits.    HEART: Heart size is normal. No pericardial effusion.    MEDIASTINUM AND NINFA:   No lymphadenopathy.  Diffuse esophageal wall thickening.    CHEST WALL AND LOWER NECK: Within normal limits.    ABDOMEN AND PELVIS:    LIVER: Within normal limits.  BILE DUCTS: Normal caliber.  GALLBLADDER: Edematous gallbladder wall.  SPLEEN:  Splenomegaly, 16 cm.  Approximately 2 cm wedge-shaped decreased attenuation peripherally lower   spleen extending to the capsule, suggestive of splenic infarct.  PANCREAS: Within normal limits.  ADRENALS: Within normal limits.  KIDNEYS/URETERS: Within normal limits.    BLADDER: Moderately distended.  REPRODUCTIVE ORGANS: Prostate not enlarged.    BOWEL:   PERITONEUM:  Evaluation of the stomach is limited without distention.    Bowel wall thickening with submucosal edema and mucosal hyperenhancement   right colon, hepatic flexure through cecum.  No bowel obstruction.  There is a moderate amount volume of abdominal and pelvic ascites.    VESSELS:  Subgastric and perisplenic collateral vessels.  RETROPERITONEUM/LYMPH NODES:  Prominent bilateral common iliac chain lymphadenopathy measuring up to   2.4 cm short axis on the right.  Bilateral external iliac chain lymphadenopathy, measuring up to 2 cm   short axis on the right.    ABDOMINAL WALL: Within normal limits.    BONES: Degenerative changes.    IMPRESSION:    Bilateral micronodular and tree-in-bud nodular opacities suggestive of   infectious/inflammatory small airway disease.    Portal hypertension, including splenomegaly and suggestion of splenic   infarct inferior pole.    Moderate volume of abdominal pelvic ascites.    Right-sided colitis, may be associated with portal colopathy versus   infectious or ischemic colitis.    Retroperitoneal/pelvic lymphadenopathy.    Other findings as discussed above.    --- End of Report ---

## 2023-05-02 NOTE — PROGRESS NOTE ADULT - ASSESSMENT
53 year old male with PMHx , hiv , ETOH abuse (last drink 3 years ago), opiate abuse (last use 1 year ago, has been on suboxone) presenting for evaluation of 2 weeks of worsening jaundice, decreased PO, weight loss, confusion. pt's sister at bedside providing history as patient is AOx1 (self) at this time. Earlier pt's Nephew stated that he sees patient daily, was started on a number of medications 3 weeks ago which he thinks were psychiatric medications, then 2 weeks ago began to decompensate. Over last 3-4 days patient was no longer oriented to time or place. Patient lives alone, nephew went to see his apartment today and noted multiple packets of suboxone on the ground, is concerned patient is taking too much. pt. is pancytopenic in the ER, occult blood positive in stool, Hb 6.7 , getting 2 units of prbc.       pancytopenia:    heme onc consulted    s/p 1 U PRBC      febrile neutropenia/ colitis    c/w invanz    f/u cultures.    workup per ID pending    hepatitis: hepatitis panel pending     gi following    ppi      coagulopathy: vitamin K    HIV: based on Dr Rey :    prezcovix 800-150mg daily, tivicay 50mg daily, descovy 200-25mg daily    anxiety/depression: bipolar?/ schizophrenia?    per roberto carlos:    gabapenin 300mg BID and 600 qhs   trazodone 50mg qhs   olanzapine 10mg qhs   sertraline 50mg qd   [id number] qd   psych eval, hold psych meds for now    suboxone dependence: restart 8/2 TID

## 2023-05-02 NOTE — CONSULT NOTE ADULT - ASSESSMENT
pt. is a 53 year old male with PMHx , HIV, ETOH abuse (last drink 3 years ago), opiate abuse (last use 1 year ago, has been on suboxone) presenting for evaluation of 2 weeks of worsening jaundice, decreased PO, weight loss, confusion. pt's sister at bedside providing history as patient is AOx1 (self).    Labs remarkable for Pancytopenia. Hgb 7.7gm, WBC 0.98, Plt 16K. INR 3.11. Alk Phos 571, AST//73. Tbili 20.5. Having dark colored BMs. US of abdomen shows Hepatomegaly with  possible early cirrhotic change. No biliary dilatation. Mild ascites. CT of abdomen and pelvis revealed Portal hypertension, Right-sided colitis, may be associated with portal colopathy versus infectious or ischemic colitis.    Plan:  Continue to trend CBC ,transfuse as needed   Can consider Vitamin K for elevated INR  - Obtain Hematology consult   - Lactulose titrated to 2-3 soft bowel movements per day, avoid diarrhea  - Rifaximin 550mg for HE  - Continue to trend LFTs   - Avoid NSAIDs, hepatotoxic drugs  - MVI, thiamine and folic acid. Optimize nutrition.

## 2023-05-03 NOTE — CONSULT NOTE ADULT - SUBJECTIVE AND OBJECTIVE BOX
REASON FOR CONSULTATION    HPI:  pt. is a 53 year old male with PMHx , hiv , ETOH abuse (last drink 3 years ago), opiate abuse (last use approximately 1 year ago), TB (as per nephew)   presenting for evaluation of 2 weeks of worsening jaundice, decreased PO, weight loss, confusion. pt's sister at bedside providing history as patient is AOx1 (self) at this time. Earlier pt's Nephew stated that he sees patient daily, was started on a number of medications 3 weeks ago which he thinks were psychiatric medications, then 2 weeks ago began to decompensate. Over last 3-4 days patient was no longer oriented to time or place. Patient lives alone, nephew went to see his apartment today and noted multiple packets of suboxone on the ground, is concerned patient is taking too much. No recent fevers, vomiting, abd pain. As per pt's sister pt. not taking his meds, hiv meds , pt. cannot verify if using his hiv meds. pt. is pancytopenic in the ER, occult blood positive in stool, Hb 6.7 , getting 2 units of prbc.     Since admission, the patient noted to have worsening pancytopenias and fevers. CT significant for hepatomegaly and splenomegaly. The patient is unable to provide an accurate history as he is altered.       REVIEW OF SYSTEMS:    REVIEW OF SYSTEMS:  Limited due to altered mental status.  [X All ROS addressed below are non-contributory, except:  Neuro: [ ] Headache [  ]Back pain [ ] Numbness [ ] Weakness [ ] Ataxia [ ] Dizziness [ ] Aphasia [ ] Dysarthria [ ] Visual disturbance  Resp: [ ] Shortness of breath/dyspnea, [ ] Orthopnea [ ] Cough  CV: [ ] Chest pain [ ] Palpitation [ ] Lightheadedness [ ] Syncope  Renal: [ ] Thirst [ ] Edema [ ] hematuria   GI: [ ] Nausea [ ] Emesis [ ] Abdominal pain [ ] Constipation [ ] Diarrhea  Hem: [ ] Hematemesis [ ] bright red blood per rectum  ID: [ ] Fever [ ] Chills [ ] Dysuria  ENT: [ ] Rhinorrhea  Neuro [ ] Numbness [ ] tingling   Psych: [x ] confusion     PAST MEDICAL & SURGICAL HISTORY:  Rotator cuff tear, right      Knee pain, right      Lumbar pain  Pt states that he is having an AXEL on       Nose pain      Alcohol abuse      Heroin abuse      HIV disease      S/P shoulder surgery  right 2015          SOCIAL HISTORY:    FAMILY HISTORY:  Family history of hypertension (Mother)        SOCIAL HISTORY:    Allergies    No Known Allergies    Intolerances        MEDICATIONS  (STANDING):  buprenorphine 8 mG/naloxone 2 mG SL Film 1 Film(s) SubLingual three times a day  nystatin    Suspension 889170 Unit(s) Oral four times a day  pantoprazole  Injectable 40 milliGRAM(s) IV Push every 12 hours  phytonadione   Solution 10 milliGRAM(s) Oral daily  piperacillin/tazobactam IVPB.- 3.375 Gram(s) IV Intermittent once  piperacillin/tazobactam IVPB.. 3.375 Gram(s) IV Intermittent every 8 hours  potassium chloride  10 mEq/100 mL IVPB 10 milliEquivalent(s) IV Intermittent every 1 hour  sodium chloride 0.9%. 1000 milliLiter(s) (125 mL/Hr) IV Continuous <Continuous>    MEDICATIONS  (PRN):      Vital Signs Last 24 Hrs  T(C): 36.7 (03 May 2023 11:45), Max: 38.3 (02 May 2023 17:30)  T(F): 98 (03 May 2023 11:45), Max: 100.9 (02 May 2023 17:30)  HR: 102 (03 May 2023 11:45) (60 - 119)  BP: 110/64 (03 May 2023 11:45) (88/47 - 112/65)  BP(mean): 69 (02 May 2023 22:06) (64 - 75)  RR: 18 (03 May 2023 11:45) (18 - 19)  SpO2: 97% (03 May 2023 11:45) (91% - 100%)    Parameters below as of 03 May 2023 11:45  Patient On (Oxygen Delivery Method): room air        PHYSICAL EXAM:    GENERAL: NAD. Patient is toxic appearing, and jaundiced. Patient is thin   HEAD:  Atraumatic, Normocephalic  EYES: + scleral icterus.   NECK: Supple, No JVD, Normal thyroid  NERVOUS SYSTEM:  Alert & Oriented X0,Motor Strength 5/5 B/L upper and lower extremities; DTRs 2+ intact and symmetric  CHEST/LUNG: Clear to percussion bilaterally; No rales, rhonchi, wheezing, or rubs  HEART: Regular rate and rhythm; No murmurs, rubs, or gallops  ABDOMEN: Soft, Nontender, Nondistended; Bowel sounds present  EXTREMITIES:  2+ Peripheral Pulses, No clubbing, cyanosis, or edema  LYMPH: No lymphadenopathy noted  SKIN: No rashes or lesions      LABS:                        6.2    0.18  )-----------( 14       ( 03 May 2023 06:50 )             19.3     05-    135  |  100  |  56.1<H>  ----------------------------<  126<H>  3.2<L>   |  22.0  |  1.26    Ca    7.0<L>      03 May 2023 06:50  Phos  4.3     05-  Mg     2.3     05-03    TPro  3.7<L>  /  Alb  1.8<L>  /  TBili  19.6<H>  /  DBili  >10.0<H>  /  AST  91<H>  /  ALT  61<H>  /  AlkPhos  420<H>  05-    PT/INR - ( 03 May 2023 06:50 )   PT: 23.1 sec;   INR: 1.98 ratio         PTT - ( 03 May 2023 06:50 )  PTT:46.4 sec  Urinalysis Basic - ( 01 May 2023 19:03 )  Bilirubin Direct, Serum: >10.0 mg/dL (23 @ 06:50)  Ferritin, Serum: 31937 ng/mL (23 @ 06:50)   HIV-1 RNA Quantitative, Viral Load (23 @ 02:58)   HIV-1 RNA Quantitative, Viral Load: NOT DET. copies/mL  HIV-1 Viral Load Result: NOT DET.  HIV-1 RNA Quantitative, Vir Load Interp: SeeComment Viral loads lower than 12 copies/ml cannot be detected reliably.  Haptoglobin, Serum: 49 mg/dL (23 @ 16:44)   Vitamin B12, Serum: >2000 pg/mL (23 @ 16:44)   Triglycerides, Serum: 209: Icteric, Interpret with caution mg/dL (23 @ 06:50)   Absolute Reticulocytes: 14.2 K/uL (23 @ 06:50)   Lactate Dehydrogenase, Serum: 395 U/L (23 @ 16:44)     Color: Yellow / Appearance: Clear / S.010 / pH: x  Gluc: x / Ketone: Negative  / Bili: Large / Urobili: 12 mg/dL   Blood: x / Protein: Negative / Nitrite: Negative   Leuk Esterase: Trace / RBC: 3-5 /HPF / WBC 3-5 /HPF   Sq Epi: x / Non Sq Epi: x / Bacteria: Few    Peripheral smear: Normal appearing neutrophils, lymphocytes, and monocytes. RBC: some teardrop by perhaps artefactual, no schistocytes, nucleated RBCs, plts: decreased. some large platelets.       RADIOLOGY & ADDITIONAL STUDIES:  < from: US Abdomen Upper Quadrant Right (23 @ 09:01) >  IMPRESSION:  Hepatomegaly with  possible early cirrhotic change.  No biliary dilatation.  Mild ascites.    < from: CT Abdomen and Pelvis w/ IV Cont (23 @ 17:45) >  IMPRESSION:    Bilateral micronodular and tree-in-bud nodular opacities suggestive of   infectious/inflammatory small airway disease.    Portal hypertension, including splenomegaly and suggestion of splenic   infarct inferior pole.    Moderate volume of abdominal pelvic ascites.    Right-sided colitis, may be associated with portal colopathy versus   infectious or ischemic colitis.    Retroperitoneal/pelvic lymphadenopathy.    Other findings as discussed above.      < from: CT Chest w/ IV Cont (23 @ 17:45) >    IMPRESSION:    Bilateral micronodular and tree-in-bud nodular opacities suggestive of   infectious/inflammatory small airway disease.    Portal hypertension, including splenomegaly and suggestion of splenic   infarct inferior pole.    Moderate volume of abdominal pelvic ascites.    Right-sided colitis, may be associated with portal colopathy versus   infectious or ischemic colitis.    Retroperitoneal/pelvic lymphadenopathy.    Other findings as discussed above.

## 2023-05-03 NOTE — BH CONSULTATION LIAISON ASSESSMENT NOTE - NSBHCHARTREVIEWINVESTIGATE_PSY_A_CORE FT
< from: 12 Lead ECG (05.01.23 @ 19:25) >      Ventricular Rate 111 BPM    Atrial Rate 111 BPM    P-R Interval 160 ms    QRS Duration 90 ms    Q-T Interval 330 ms    QTC Calculation(Bazett) 448 ms    P Axis 70 degrees    R Axis 61 degrees    T Axis 64 degrees    Diagnosis Line Sinus tachycardia    Confirmed by PARESH HAYDEN ([id number]) on 5/1/2023 10:27:07 PM    < end of copied text >

## 2023-05-03 NOTE — PROVIDER CONTACT NOTE (OTHER) - ASSESSMENT
Pt is confused and agitated at times. Pt on isolation to Rule out TB
Pt is awake, disoriented to time and situation. Ongoing tele monitoring with Sinus Tachy 120's and PA is aware.

## 2023-05-03 NOTE — BH CONSULTATION LIAISON ASSESSMENT NOTE - NSICDXPASTMEDICALHX_GEN_ALL_CORE_FT
PAST MEDICAL HISTORY:  Alcohol abuse     Heroin abuse     HIV disease     Knee pain, right     Lumbar pain Pt states that he is having an AXEL on 7/30    Nose pain     Rotator cuff tear, right

## 2023-05-03 NOTE — PROVIDER CONTACT NOTE (CHANGE IN STATUS NOTIFICATION) - SITUATION
Notify of low blood pressure and oxygen saturation of 87-88% on room air. Patient has a pending Platelets transfusion.

## 2023-05-03 NOTE — CONSULT NOTE ADULT - SUBJECTIVE AND OBJECTIVE BOX
HPI:  52yo M w/ PMH of HIV, EtOH use disorder (last drink reportedly 3 yrs ago), OUD on suboxone (last use reportedly 1 yr ago) who p/w worsening jaundice, wt loss, AMS.  Per family, pt was started on multiple ?psych medications a few wks PTA and subsequently began to decompensate and became increasingly confused.  In ED, pt found to be pancytopenic, FOBT+, LFT derangements including Tbili=19.6 and albumin=1.8.  Pt with low-grade fever (Hhue=998.9).  Pt has undetectable HIV viral load.  CT shows B/L pulmonary tree-in-bud opacities, poral HTN w/ splenomegaly and ?splenic infarct, moderate ascites, rt-side colitis, and retroperitoneal/pelvic LAD. Abd U/S revealed hepatomegaly with  possible early cirrhotic change, no biliary dilatation and mild ascites.  Oncology c/s'd and recommend BM bx.  We are consulted for assistance with advance directives.        PERTINENT PMH REVIEWED: Yes    PAST MEDICAL & SURGICAL HISTORY:  as per HPI    SOCIAL HISTORY:                                            Surrogate/HCP/Guardian: Phone#: Sil Leos listed as contact - unclear what relation - [phone]    FAMILY HISTORY:  Family history of hypertension (Mother)    Allergies  No Known Allergies    ADVANCE DIRECTIVES/TREATMENT PREFERENCES:  Full code, all aggressive measures desired     Baseline ADLs (prior to admission):  Independent  Dependent      Karnofsky/Palliative Performance Status Version 2:  %  http://npcrc.org/files/news/palliative_performance_scale_ppsv2.pdf    PRESENT SYMPTOMS:     Dyspnea: Yes No  Nausea/Vomiting: Yes No  Anxiety:  Yes No  Depression: Yes No  Fatigue: Yes No  Loss of appetite: Yes No  Constipation: Yes No    PAIN:              Character-            Duration-            Effect-            Factors-            Frequency-            Location-            Severity-    PAIN AD SCORE:  [email or web address].[place].[facility]/cog/painad.pdf (press ctrl + left click to view)    REVIEW OF SYSTEMS:   Full review of systems performed and was otherwise negative except as noted above.  Due to altered mental status, unable to obtain subjective information from the patient. History was obtained, to the extent possible, from review of the chart and collateral sources of information.    MEDICATIONS  (STANDING):  buprenorphine 8 mG/naloxone 2 mG SL Film 1 Film(s) SubLingual three times a day  nystatin    Suspension 207193 Unit(s) Oral four times a day  pantoprazole  Injectable 40 milliGRAM(s) IV Push every 12 hours  phytonadione   Solution 10 milliGRAM(s) Oral daily  piperacillin/tazobactam IVPB. 3.375 Gram(s) IV Intermittent once  piperacillin/tazobactam IVPB.- 3.375 Gram(s) IV Intermittent once  piperacillin/tazobactam IVPB.. 3.375 Gram(s) IV Intermittent every 8 hours  potassium chloride  10 mEq/100 mL IVPB 10 milliEquivalent(s) IV Intermittent every 1 hour  sodium chloride 0.9%. 1000 milliLiter(s) (125 mL/Hr) IV Continuous <Continuous>    MEDICATIONS  (PRN):      PHYSICAL EXAM:    Vital Signs Last 24 Hrs  T(C): 35.4 (03 May 2023 09:05), Max: 38.3 (02 May 2023 17:30)  T(F): 95.7 (03 May 2023 09:05), Max: 100.9 (02 May 2023 17:30)  HR: 84 (03 May 2023 09:05) (60 - 119)  BP: 103/63 (03 May 2023 09:05) (88/47 - 103/63)  BP(mean): 69 (02 May 2023 22:06) (64 - 75)  RR: 18 (03 May 2023 09:05) (18 - 19)  SpO2: 98% (03 May 2023 09:05) (91% - 100%)    Parameters below as of 03 May 2023 09:05  Patient On (Oxygen Delivery Method): nasal cannula  O2 Flow (L/min): 2      General: pt lying in bed in NAD  HEENT: NCAT; MMM  Lungs: breathing unlabored; symmetric chest expansion  GI: soft; NTND  MSK: No apparent deformities  Neuro: fluent speech; awake and conversant; oriented x 3  Skin: no rash  Psych: calm; appropriate speech and affect    LABS:                        6.2    0.18  )-----------( 14       ( 03 May 2023 06:50 )             19.3     05-03    135  |  100  |  56.1<H>  ----------------------------<  126<H>  3.2<L>   |  22.0  |  1.26    Ca    7.0<L>      03 May 2023 06:50  Phos  4.3     05-03  Mg     2.3     05-03    TPro  3.7<L>  /  Alb  1.8<L>  /  TBili  19.6<H>  /  DBili  >10.0<H>  /  AST  91<H>  /  ALT  61<H>  /  AlkPhos  420<H>  05-03    PT/INR - ( 03 May 2023 06:50 )   PT: 23.1 sec;   INR: 1.98 ratio         PTT - ( 03 May 2023 06:50 )  PTT:46.4 sec    RADIOLOGY & ADDITIONAL STUDIES:  CT:  IMPRESSION:    Bilateral micronodular and tree-in-bud nodular opacities suggestive of   infectious/inflammatory small airway disease.    Portal hypertension, including splenomegaly and suggestion of splenic   infarct inferior pole.    Moderate volume of abdominal pelvic ascites.    Right-sided colitis, may be associated with portal colopathy versus   infectious or ischemic colitis.    Retroperitoneal/pelvic lymphadenopathy.    Other findings as discussed above.    --- End of Report ---    FRANKO HOLDEN MD; Attending Radiologist  This document has been electronically signed. May  1 2023  6:19PM    NEUROLOGIC MEDICATIONS/OPIOIDS/BENZODIAZEPINES IN PAST 24HRS  acetaminophen     Tablet ..   650 milliGRAM(s) Oral (05-02-23 @ 18:11)    buprenorphine 8 mG/naloxone 2 mG SL Film   1 Film(s) SubLingual (05-03-23 @ 05:11)   1 Film(s) SubLingual (05-02-23 @ 22:12)   1 Film(s) SubLingual (05-02-23 @ 17:29)       HPI:  52yo M w/ PMH of HIV, EtOH use disorder (last drink reportedly 3 yrs ago), OUD on suboxone (last use reportedly 1 yr ago) who p/w worsening jaundice, wt loss, AMS.  Per family, pt was started on multiple ?psych medications a few wks PTA and subsequently began to decompensate and became increasingly confused.  In ED, pt found to be pancytopenic, FOBT+, LFT derangements including Tbili=19.6 and albumin=1.8.  Pt with low-grade fever (Mfti=534.9).  Pt has undetectable HIV viral load.  CT shows B/L pulmonary tree-in-bud opacities, poral HTN w/ splenomegaly and ?splenic infarct, moderate ascites, rt-side colitis, and retroperitoneal/pelvic LAD. Abd U/S revealed hepatomegaly with  possible early cirrhotic change, no biliary dilatation and mild ascites.  Oncology c/s'd and recommend BM bx - differential includes underlying BM failure from malignancy, HLH, medications, drug use, and acute infection in the setting of HIV.  We are consulted for assistance with advance directives.     Pt seen lying in bed - deeply jaundiced and cachectic.  Spoke to pt and family with  #[id number] - he is conversant but tangential and very confused.  Denies pain currently - does report feeling very confused.  Other hx obtained from sisters at     PERTINENT PMH REVIEWED: Yes    PAST MEDICAL & SURGICAL HISTORY:  as per HPI    SOCIAL HISTORY:                      Pt lives alone and was independent prior to admission  He has 5 siblings - 3 sisters (Arleth, Arturo, and Sil) and 2 brothers (Lamont and Tejas) - all are local except Tejas who lives in [place].                      Surrogate/HCP/Guardian: Phone#: sister Sil Leos listed as contact - unclear what relation - [phone]    FAMILY HISTORY:  Family history of hypertension (Mother)    Allergies  No Known Allergies    ADVANCE DIRECTIVES/TREATMENT PREFERENCES:  Full code, all aggressive measures desired     Baseline ADLs (prior to admission):  Independent    Karnofsky/Palliative Performance Status Version 2:  30%  http://npcrc.org/files/news/palliative_performance_scale_ppsv2.pdf    PRESENT SYMPTOMS:   Unable to obtain 2/2 AMS    PAIN:  denies            Character-            Duration-            Effect-            Factors-            Frequency-            Location-            Severity-    REVIEW OF SYSTEMS:   Full review of systems performed and was otherwise negative except as noted above.    MEDICATIONS  (STANDING):  buprenorphine 8 mG/naloxone 2 mG SL Film 1 Film(s) SubLingual three times a day  nystatin    Suspension 119336 Unit(s) Oral four times a day  pantoprazole  Injectable 40 milliGRAM(s) IV Push every 12 hours  phytonadione   Solution 10 milliGRAM(s) Oral daily  piperacillin/tazobactam IVPB. 3.375 Gram(s) IV Intermittent once  piperacillin/tazobactam IVPB.- 3.375 Gram(s) IV Intermittent once  piperacillin/tazobactam IVPB.. 3.375 Gram(s) IV Intermittent every 8 hours  potassium chloride  10 mEq/100 mL IVPB 10 milliEquivalent(s) IV Intermittent every 1 hour  sodium chloride 0.9%. 1000 milliLiter(s) (125 mL/Hr) IV Continuous <Continuous>    MEDICATIONS  (PRN):      PHYSICAL EXAM:    Vital Signs Last 24 Hrs  T(C): 35.4 (03 May 2023 09:05), Max: 38.3 (02 May 2023 17:30)  T(F): 95.7 (03 May 2023 09:05), Max: 100.9 (02 May 2023 17:30)  HR: 84 (03 May 2023 09:05) (60 - 119)  BP: 103/63 (03 May 2023 09:05) (88/47 - 103/63)  BP(mean): 69 (02 May 2023 22:06) (64 - 75)  RR: 18 (03 May 2023 09:05) (18 - 19)  SpO2: 98% (03 May 2023 09:05) (91% - 100%)    Parameters below as of 03 May 2023 09:05  Patient On (Oxygen Delivery Method): nasal cannula  O2 Flow (L/min): 2    General: pt lying in bed in NAD - cachectic and deeply jaundiced  HEENT: NCAT; MM dry; scleral icterus; temporal wasting  Lungs: breathing unlabored; symmetric chest expansion  MSK: No apparent deformities  Neuro: awake and conversant; oriented x 3  Skin: no rash  Psych: calm; tangential and non-linear speech    LABS:                        6.2    0.18  )-----------( 14       ( 03 May 2023 06:50 )             19.3     05-03    135  |  100  |  56.1<H>  ----------------------------<  126<H>  3.2<L>   |  22.0  |  1.26    Ca    7.0<L>      03 May 2023 06:50  Phos  4.3     05-03  Mg     2.3     05-03    TPro  3.7<L>  /  Alb  1.8<L>  /  TBili  19.6<H>  /  DBili  >10.0<H>  /  AST  91<H>  /  ALT  61<H>  /  AlkPhos  420<H>  05-03    PT/INR - ( 03 May 2023 06:50 )   PT: 23.1 sec;   INR: 1.98 ratio         PTT - ( 03 May 2023 06:50 )  PTT:46.4 sec    RADIOLOGY & ADDITIONAL STUDIES:  CT:  IMPRESSION:    Bilateral micronodular and tree-in-bud nodular opacities suggestive of   infectious/inflammatory small airway disease.    Portal hypertension, including splenomegaly and suggestion of splenic   infarct inferior pole.    Moderate volume of abdominal pelvic ascites.    Right-sided colitis, may be associated with portal colopathy versus   infectious or ischemic colitis.    Retroperitoneal/pelvic lymphadenopathy.    Other findings as discussed above.    --- End of Report ---    FRANKO HOLDEN MD; Attending Radiologist  This document has been electronically signed. May  1 2023  6:19PM    NEUROLOGIC MEDICATIONS/OPIOIDS/BENZODIAZEPINES IN PAST 24HRS  acetaminophen     Tablet ..   650 milliGRAM(s) Oral (05-02-23 @ 18:11)    buprenorphine 8 mG/naloxone 2 mG SL Film   1 Film(s) SubLingual (05-03-23 @ 05:11)   1 Film(s) SubLingual (05-02-23 @ 22:12)   1 Film(s) SubLingual (05-02-23 @ 17:29)

## 2023-05-03 NOTE — CHART NOTE - NSCHARTNOTEFT_GEN_A_CORE
While rounding on floor, spoke w/ RN regarding pt's elevated HR.  Recently completed 2L of NS IVF bolus and receiving maintenance @ 125cc/hr currently.  RN was made aware to monitor HR and escalate if HR sustains above 120 or if pt becomes symptomatic.      23:15  Called by RN after pt c/o feeling warm, obtained rectal temp of 101.8.  Pt was started on Zosyn and Diflucan today.      ICU Vital Signs Last 24 Hrs  T(C): 38.8 (03 May 2023 23:20), Max: 38.8 (03 May 2023 23:20)  T(F): 101.8 (03 May 2023 23:20), Max: 101.8 (03 May 2023 23:20)  HR: 125 (03 May 2023 23:20) (78 - 125)  BP: 121/73 (03 May 2023 23:20) (95/52 - 121/73)  BP(mean): 89 (03 May 2023 23:20) (89 - 89)  ABP: --  ABP(mean): --  RR: 20 (03 May 2023 23:20) (18 - 20)  SpO2: 92% (03 May 2023 23:20) (92% - 98%)    O2 Parameters below as of 03 May 2023 23:20  Patient On (Oxygen Delivery Method): nasal cannula  O2 Flow (L/min): 2    Pt in NAD    Fever    Reviewed labs resulted @ 21:50, not notified, K 3.4, supplemented.  BUN continues to be very high, likely from volume depletion, got 2L bolus w/ maintenance, will f/u with am repeat labs  Ofirmev 1gm IVPBx1 stat  Blood cultures x2 stat  Lactate, procalcitonin  RN to monitor for improvement in symptoms and escalate prn While rounding on floor, spoke w/ RN regarding pt's elevated HR.  Recently completed 2L of NS IVF bolus and receiving maintenance @ 125cc/hr currently.  RN was made aware to monitor HR and escalate if HR sustains above 120 or if pt becomes symptomatic.      23:15  Called by RN after pt c/o feeling warm, obtained rectal temp of 101.8.  Reviewed chart, as per MD note, pt to be transferred to SDU for close monitoring.   Pt was started on Zosyn and Diflucan today.      ICU Vital Signs Last 24 Hrs  T(C): 38.8 (03 May 2023 23:20), Max: 38.8 (03 May 2023 23:20)  T(F): 101.8 (03 May 2023 23:20), Max: 101.8 (03 May 2023 23:20)  HR: 125 (03 May 2023 23:20) (78 - 125)  BP: 121/73 (03 May 2023 23:20) (95/52 - 121/73)  BP(mean): 89 (03 May 2023 23:20) (89 - 89)  ABP: --  ABP(mean): --  RR: 20 (03 May 2023 23:20) (18 - 20)  SpO2: 92% (03 May 2023 23:20) (92% - 98%)    O2 Parameters below as of 03 May 2023 23:20  Patient On (Oxygen Delivery Method): nasal cannula  O2 Flow (L/min): 2    Pt in NAD    Fever    Transfer to SDU order placed as per MD note   Reviewed labs resulted @ 21:50, not notified, K 3.4, supplemented.  BUN continues to be very high, likely from volume depletion, got 2L bolus w/ maintenance, will f/u with am repeat labs  Ofirmev 1gm IVPBx1 stat  Blood cultures x2 stat  Lactate, procalcitonin  RN to monitor for improvement in symptoms and escalate prn While rounding on floor, spoke w/ RN regarding pt's elevated HR.  Recently completed 2L of NS IVF bolus and receiving maintenance @ 125cc/hr currently.  RN was made aware to monitor HR and escalate if HR sustains above 120 or if pt becomes symptomatic.      23:15  Called by RN after pt c/o feeling warm, obtained rectal temp of 101.8.  Reviewed chart, as per MD note, pt to be transferred to SDU for close monitoring.   Pt was started on Zosyn and Diflucan today.      ICU Vital Signs Last 24 Hrs  T(C): 38.8 (03 May 2023 23:20), Max: 38.8 (03 May 2023 23:20)  T(F): 101.8 (03 May 2023 23:20), Max: 101.8 (03 May 2023 23:20)  HR: 125 (03 May 2023 23:20) (78 - 125)  BP: 121/73 (03 May 2023 23:20) (95/52 - 121/73)  BP(mean): 89 (03 May 2023 23:20) (89 - 89)  ABP: --  ABP(mean): --  RR: 20 (03 May 2023 23:20) (18 - 20)  SpO2: 92% (03 May 2023 23:20) (92% - 98%)    O2 Parameters below as of 03 May 2023 23:20  Patient On (Oxygen Delivery Method): nasal cannula  O2 Flow (L/min): 2    Pt in NAD    Fever    Transfer to SDU order placed as per MD note   Reviewed labs resulted @ 21:50, not notified, K 3.4, supplemented.  BUN continues to be very high, likely from volume depletion, got 2L bolus w/ maintenance, will f/u with am repeat labs  Ofirmev 1gm IVPBx1 stat  Blood cultures x2 stat  Lactate, procalcitonin  RN to monitor for improvement in symptoms and escalate prn    5/4/23  01:30    Lactate 1.4  Procalcitonin 11.99 - on Zosyn  Followed up w/ RN, pt doing better after ofirmev.  HR in 90s; CIWA @5  Awaiting transfer to SDU  Monitor and reassess prn    05:30  Called by 3T RN pt is desating on 2L to 80s, was placed on 40%VM SpO2 improved to 92-96%.  Stat ABG/CXR ordered.  Pt received more than 3L in last 12 hrs.  RN to d/c maintenance IVF  Seen pt by bedside, pt found squirming, pulling on condom catheter    ICU Vital Signs Last 24 Hrs  T(C): 37.4 (04 May 2023 04:01), Max: 38.8 (03 May 2023 23:20)  T(F): 99.3 (04 May 2023 04:01), Max: 101.8 (03 May 2023 23:20)  HR: 95 (04 May 2023 06:00) (84 - 125)  BP: 107/70 (04 May 2023 06:00) (102/61 - 121/73)  BP(mean): 82 (04 May 2023 06:00) (79 - 89)  ABP: --  ABP(mean): --  RR: 17 (04 May 2023 04:01) (17 - 20)  SpO2: 92% (04 May 2023 06:00) (92% - 98%)    O2 Parameters below as of 04 May 2023 06:00  Patient On (Oxygen Delivery Method): mask, Venturi    O2 Concentration (%): 40    General   Pt is A&Ox1 to person, mildly agitated  Lungs      +scattered crackles in b/l fields; tachypneic  Heart      s1/s2 tachy  Ext          no edema  skin         icteric    Dyspnea  CXR - +mildly increased pulmonary markings seen when compared to previous on 5/1  Lasix 20mg IVPx1  D/c Maintenance IVF for now  Monitor for improvement    06:15  ABG - ( 04 May 2023 06:09 )  pH, Arterial: 7.320 pH, Blood: x     /  pCO2: 35    /  pO2: 98    / HCO3: 18    / Base Excess: -8.1  /  SaO2: 98.6   Will sign out to day team to f/u While rounding on floor, spoke w/ RN regarding pt's elevated HR.  Recently completed 2L of NS IVF bolus and receiving maintenance @ 125cc/hr currently.  RN was made aware to monitor HR and escalate if HR sustains above 120 or if pt becomes symptomatic.      23:15  Called by RN after pt c/o feeling warm, obtained rectal temp of 101.8.  Reviewed chart, as per MD note, pt to be transferred to SDU for close monitoring.   Pt was started on Zosyn and Diflucan today.      ICU Vital Signs Last 24 Hrs  T(C): 38.8 (03 May 2023 23:20), Max: 38.8 (03 May 2023 23:20)  T(F): 101.8 (03 May 2023 23:20), Max: 101.8 (03 May 2023 23:20)  HR: 125 (03 May 2023 23:20) (78 - 125)  BP: 121/73 (03 May 2023 23:20) (95/52 - 121/73)  BP(mean): 89 (03 May 2023 23:20) (89 - 89)  ABP: --  ABP(mean): --  RR: 20 (03 May 2023 23:20) (18 - 20)  SpO2: 92% (03 May 2023 23:20) (92% - 98%)    O2 Parameters below as of 03 May 2023 23:20  Patient On (Oxygen Delivery Method): nasal cannula  O2 Flow (L/min): 2    Pt in NAD    Fever    Transfer to SDU order placed as per MD note   Reviewed labs resulted @ 21:50, not notified, K 3.4, supplemented.  BUN continues to be very high, likely from volume depletion, got 2L bolus w/ maintenance, will f/u with am repeat labs  Ofirmev 1gm IVPBx1 stat  Blood cultures x2 stat  Lactate, procalcitonin  RN to monitor for improvement in symptoms and escalate prn    5/4/23  01:30    Lactate 1.4  Procalcitonin 11.99 - on Zosyn  Followed up w/ RN, pt doing better after ofirmev.  HR in 90s; CIWA @5  Awaiting transfer to SDU  Monitor and reassess prn    05:30  Called by 3T RN pt is desating on 2L to 80s, was placed on 40%VM SpO2 improved to 92-96%.  Stat ABG/CXR ordered.  Pt received more than 3L in last 12 hrs.  RN to d/c maintenance IVF  RN SUNITHA scored @ 20 upon pt's arrival to floor @04:00, Ativan given w/ improvement.  Seen pt by bedside, pt found squirming, pulling on condom catheter.      ICU Vital Signs Last 24 Hrs  T(C): 37.4 (04 May 2023 04:01), Max: 38.8 (03 May 2023 23:20)  T(F): 99.3 (04 May 2023 04:01), Max: 101.8 (03 May 2023 23:20)  HR: 95 (04 May 2023 06:00) (84 - 125)  BP: 107/70 (04 May 2023 06:00) (102/61 - 121/73)  BP(mean): 82 (04 May 2023 06:00) (79 - 89)  ABP: --  ABP(mean): --  RR: 17 (04 May 2023 04:01) (17 - 20)  SpO2: 92% (04 May 2023 06:00) (92% - 98%)    O2 Parameters below as of 04 May 2023 06:00  Patient On (Oxygen Delivery Method): mask, Venturi    O2 Concentration (%): 40    General   Pt is A&Ox1 to person, mildly agitated  Lungs      +scattered crackles in b/l fields; tachypneic  Heart      s1/s2 tachy  Ext          no edema  skin         icteric    Dyspnea  CXR - +mildly increased pulmonary markings seen when compared to previous on 5/1  Lasix 20mg IVPx1  D/c Maintenance IVF for now  Monitor for improvement    06:15  ABG - ( 04 May 2023 06:09 )  pH, Arterial: 7.320 pH, Blood: x     /  pCO2: 35    /  pO2: 98    / HCO3: 18    / Base Excess: -8.1  /  SaO2: 98.6   Will sign out to day team to f/u

## 2023-05-03 NOTE — PROVIDER CONTACT NOTE (OTHER) - BACKGROUND
Pt is admitted for Sepsis, Colitis with Hx HIV dse, ETOH abuse& Heroin abuse
Pt is admitted for Sepsis

## 2023-05-03 NOTE — PROGRESS NOTE ADULT - ASSESSMENT
53 year old male with PMHx , hiv , ETOH abuse (last drink 3 years ago), opiate abuse (last use 1 year ago, has been on suboxone) presenting for evaluation of 2 weeks of worsening jaundice, decreased PO, weight loss, confusion. In the ER, found to be pancytopenic with occult blood positive in stool, Hb 6.7 , getting 2 units of prbc.       Pancytopenia - Unclear etiology at this time  Obtained collateral information from [facility]. Patients cell lines were normal in 9/2022 then began decreasing in March. patient did not follow up after that despite efforts by clinic.   COncern for underlying bone marrow process +/- infectious process HLH?   Heme onc consulted  IR consulted for biopsy   s/p 3U PRBC      Sepsis Present on admission  HIV - Stopped taking meds month ago.  Febrile neutropenia/ colitis. Possible AIDS, MAC, TB?  Switched to Zosyn for pseudomonal coverage   f/u cultures  ID consulted  AFB induction  Follow up infectious work up sent    Acute Hepatitis with elevated transaminases and elevated bilirubin  coagulopathy:   Cont vitamin K  Viral panel neg  trend liver function  GI following    HIV: based on Dr Rey :    prezcovix 800-150mg daily, tivicay 50mg daily, descovy 200-25mg daily    Anxiety/depression: bipolar?/ schizophrenia?    per roberto carlos:    gabapenin 300mg BID and 600 qhs   trazodone 50mg qhs   olanzapine 10mg qhs   sertraline 50mg qd   [id number] qd   psych eval, hold psych meds for now    History of opioid abuse, alcohol abuse  follows at [facility] has been prescribed suboxone for many years. Was filling prescriptions regularly.  Stopped going to clinic in march.   Per Sam Cooley patient was found with several completely full boxes of suboxone, the most recent being from March 2023.   Raising suspicion for non compliance and drug use but urine drug screen did not have opiates on admission.  8/2 TID      Tachycardia   EKG with sinus tach, no ischemia   Minimal improvement with saline bolus   Added additional saline bolus at 1700.  Consideration for alcohol withdrawal? Patient is within 72 hour window of admission. And has tremors on exam. Discussed with Sam Zaldivar who endorses that he has been cleaning the patient's room for last 3 weeks because he has not been able to walk around and has not seen any alcohol.   Possibly secretly ingesting?  Will trial valium 10mg ivp and observe for potential response  cont tele  upgrade to stepdown  if no improvement after valium and 2nd liter will consult ICU      Hypokalemia  Replete IV          53 year old male with PMHx , hiv , ETOH abuse (last drink 3 years ago), opiate abuse (last use 1 year ago, has been on suboxone) presenting for evaluation of 2 weeks of worsening jaundice, decreased PO, weight loss, confusion. In the ER, found to be pancytopenic with occult blood positive in stool, Hb 6.7 , getting 2 units of prbc.       Pancytopenia - Unclear etiology at this time  Obtained collateral information from [facility]. Patients cell lines were normal in 9/2022 then began decreasing in March. patient did not follow up after that despite efforts by clinic.   COncern for underlying bone marrow process +/- infectious process HLH?   Heme onc consulted  IR consulted for biopsy   s/p 3U PRBC      Sepsis Present on admission  HIV - Stopped taking meds month ago.  Febrile neutropenia/ colitis. Possible AIDS, MAC, TB?  Switched to Zosyn for pseudomonal coverage   f/u cultures  ID consulted  AFB induction  Follow up infectious work up sent    Acute Hepatitis with elevated transaminases and elevated bilirubin  coagulopathy:   Cont vitamin K  Viral panel neg  trend liver function  GI following    HIV: based on Dr Rey :   prezcovix 800-150mg daily, tivicay 50mg daily, descovy 200-25mg daily    Anxiety/depression: bipolar?/ schizophrenia?  per roberto carlos:   gabapenin 300mg BID and 600 qhs  trazodone 50mg qhs  olanzapine 10mg qhs  sertraline 50mg qd  [id number] qd  psych eval, hold psych meds for now    History of opioid abuse, alcohol abuse  follows at [facility] has been prescribed suboxone for many years. Was filling prescriptions regularly.  Stopped going to clinic in march.   Per Sam Cooley patient was found with several completely full boxes of suboxone, the most recent being from March 2023.   Raising suspicion for non compliance and drug use but urine drug screen did not have opiates on admission.  8/2 TID    Tachycardia   EKG with sinus tach, no ischemia   Minimal improvement with saline bolus   Added additional saline bolus at 1700.  Consideration for alcohol withdrawal? Patient is within 72 hour window of admission. And has tremors on exam. Discussed with Sam Zaldivar who endorses that he has been cleaning the patient's room for last 3 weeks because he has not been able to walk around and has not seen any alcohol.   Possibly secretly ingesting?  Will trial valium 10mg ivp and observe for potential response  cont tele  upgrade to stepdown  if no improvement after valium and 2nd liter will consult ICU    Hypokalemia  Replete IV     Morning cbc, bmp reviewed. Morning CBC, BMP ordered  DVT ppx:  Dispo: Acute. Upgrade to step-down.   Case Discussed with Psychiatry, ID, Hematology     Total time spent reviewing chart, images, labs as well as peforming history, physical exam, obtaining collateral history from nephew, [place] [facility] discussing with patient and family as well as psychiatry, ID , hematology attending, as well as discussing case with [facility] Hematology - 180 Minutes

## 2023-05-03 NOTE — BH CONSULTATION LIAISON ASSESSMENT NOTE - HPI (INCLUDE ILLNESS QUALITY, SEVERITY, DURATION, TIMING, CONTEXT, MODIFYING FACTORS, ASSOCIATED SIGNS AND SYMPTOMS)
Patient is a 53 year old male who is unemployed, living in a rented room alone, with a history of depression as well as alcohol and opioid use disorder and a PMH of HIV who presented with weakness, AMS and jaundice. Psychiatry called to help with medication management     Patient seen and found to be awake and alert but confused while oriented to person only and was re orientable to place. Patient stating he does not feel well but could not elaborate and then  making non sensical statements     As per nurse report, patient confused with fluctuating mentation but no behavioral disturbance.     collateral taken from patients shay Zaldivar [phone] who gave majority of history. nephew states patient has history of depression but no known psychiatric admissions or suicide attempts. He also has long history of alcohol and opoid use but has been sober for along time. nephew explains how 3 weeks ago he saw patient and he looked yellow, was very weak but would not go to the hospital. Nephew kept in contact with patients land lord for the past 3 weeks and over the past 3 weeks, patient did not leave the room, became weaker, was not eating and then became confused.

## 2023-05-03 NOTE — PROGRESS NOTE ADULT - SUBJECTIVE AND OBJECTIVE BOX
[facility]  INFECTIOUS DISEASES at [place] and [place]  ===============================================================                               Ruben Perez MD*     Ailyn Wallace MD*                         Selvin Glez MD*       Leticia Landon MD*            Diplomates American Board of Internal Medicine & Infectious Diseases                * [place] Office - Appt - Tel  [phone] Fax [phone]                * [place] Office - Appt - Tel [phone] Fax [phone]                                  Hospital Consult line:  [phone]  ==============================================================    YONATHAN FUNK [id number]    Follow up: HIV, pancytopenia, hyperbilirubinemia     Hypothermic  Requiring supplemental oxygen by NC   Family at bedside     I have personally reviewed the labs and data; pertinent labs and data are listed in this note; please see below.     _______________________________________________________________  REVIEW OF SYSTEMS  Limited  ROS - unable to obtain meaningful information from patient  ________________________________________________________________  Allergies:  No Known Allergies        ________________________________________________________________  PHYSICAL EXAM  GEN: ill appearing, cachectic   HEENT: icteric sclerae. +Thrush   NECK: Supple. Mid-line trachea. No palpable masses or LN  LUNGS: eupneic. CTA B/L.  HEART: RRR, no m/r/g  ABDOMEN: Soft, mildly distended, splenomegaly, +BS  :  No Kirk catheter  EXTREMITIES: well perfused, without  edema.  MSK: No joint swelling or deformity   NEUROLOGIC: confused, tangential conversation, moving all extremities follows commands; +flapping/tremors   SKIN: No rash or wounds. Marked jaundice  LINES: no central lines, only peripheral access c/d/i  ________________________________________________________________  Vitals:  T(F): 97.5 (03 May 2023 13:05), Max: 100.9 (02 May 2023 17:30)  HR: 109 (03 May 2023 13:05)  BP: 114/70 (03 May 2023 13:05)  RR: 18 (03 May 2023 13:05)  SpO2: 97% (03 May 2023 13:05) (92% - 100%)  temp max in last 48H T(F): , Max: 100.9 (05-02-23 @ 17:30)    Current Antibiotics:  nystatin    Suspension [id number] Unit(s) Oral four times a day  piperacillin/tazobactam IVPB.. 3.375 Gram(s) IV Intermittent every 8 hours    Other medications:  buprenorphine 8 mG/naloxone 2 mG SL Film 1 Film(s) SubLingual three times a day  pantoprazole  Injectable 40 milliGRAM(s) IV Push every 12 hours  phytonadione   Solution 10 milliGRAM(s) Oral daily  sodium chloride 0.9%. 1000 milliLiter(s) IV Continuous <Continuous>  sodium chloride 3%  Inhalation 4 milliLiter(s) Inhalation every 4 hours                            6.2    0.18  )-----------( 14       ( 03 May 2023 06:50 )             19.3     05-03    135  |  100  |  56.1<H>  ----------------------------<  126<H>  3.2<L>   |  22.0  |  1.26    Ca    7.0<L>      03 May 2023 06:50  Phos  4.3     05-03  Mg     2.3     05-03    TPro  3.7<L>  /  Alb  1.8<L>  /  TBili  19.6<H>  /  DBili  >10.0<H>  /  AST  91<H>  /  ALT  61<H>  /  AlkPhos  420<H>  05-03    RECENT CULTURES:  05-01 @ 19:03 Clean Catch Clean Catch (Midstream)     <10,000 CFU/mL Normal Urogenital Zoë        05-01 @ 16:44    RVP with SARS-CoV-2   NotDetec      05-01 @ 14:55 .Blood Blood-Peripheral     No growth to date.        05-01 @ 14:40 .Blood Blood-Peripheral     No growth to date.        WBC Count: 0.18 K/uL (05-03-23 @ 06:50)  WBC Count: 0.30 K/uL (05-02-23 @ 02:58)  WBC Count: 0.94 K/uL (05-01-23 @ 14:55)    Creatinine, Serum: 1.26 mg/dL (05-03-23 @ 06:50)  Creatinine, Serum: 1.01 mg/dL (05-02-23 @ 02:58)  Creatinine, Serum: 1.14 mg/dL (05-01-23 @ 14:55)       SARS-CoV-2: NotDetec (05-01-23 @ 16:44)    ________________________________________________________________  RADIOLOGY  < from: CT Abdomen and Pelvis w/ IV Cont (05.01.23 @ 17:45) >    FINDINGS:    CHEST:    LUNGS AND LARGE AIRWAYS:  PLEURA:  There are bilateral micronodular, tree-in-bud nodular opacities upper and   lower lung zones compatible with infectious/inflammatory small airway   disease.  There are mild dependent subsegmental atelectatic changes right lower   lobe.  No lobar lung consolidation, pleural effusion or pneumothorax.    The central airways are patent.    VESSELS: Within normal limits.    HEART: Heart size is normal. No pericardial effusion.    MEDIASTINUM AND NINFA:   No lymphadenopathy.  Diffuse esophageal wall thickening.    CHEST WALL AND LOWER NECK: Within normal limits.    ABDOMEN AND PELVIS:    LIVER: Within normal limits.  BILE DUCTS: Normal caliber.  GALLBLADDER: Edematous gallbladder wall.  SPLEEN:  Splenomegaly, 16 cm.  Approximately 2 cm wedge-shaped decreased attenuation peripherally lower   spleen extending to the capsule, suggestive of splenic infarct.  PANCREAS: Within normal limits.  ADRENALS: Within normal limits.  KIDNEYS/URETERS: Within normal limits.    BLADDER: Moderately distended.  REPRODUCTIVE ORGANS: Prostate not enlarged.    BOWEL:   PERITONEUM:  Evaluation of the stomach is limited without distention.    Bowel wall thickening with submucosal edema and mucosal hyperenhancement   right colon, hepatic flexure through cecum.  No bowel obstruction.  There is a moderate amount volume of abdominal and pelvic ascites.    VESSELS:  Subgastric and perisplenic collateral vessels.  RETROPERITONEUM/LYMPH NODES:  Prominent bilateral common iliac chain lymphadenopathy measuring up to   2.4 cm short axis on the right.  Bilateral external iliac chain lymphadenopathy, measuring up to 2 cm   short axis on the right.    ABDOMINAL WALL: Within normal limits.    BONES: Degenerative changes.    IMPRESSION:    Bilateral micronodular and tree-in-bud nodular opacities suggestive of   infectious/inflammatory small airway disease.    Portal hypertension, including splenomegaly and suggestion of splenic   infarct inferior pole.    Moderate volume of abdominal pelvic ascites.    Right-sided colitis, may be associated with portal colopathy versus   infectious or ischemic colitis.    Retroperitoneal/pelvic lymphadenopathy.    Other findings as discussed above.    < end of copied text >    < from: CT Head No Cont (05.01.23 @ 17:42) >      INTERPRETATION:  CLINICAL INDICATION: Altered mental status    TECHNIQUE: Axial CT scanning of the brain was obtained from the skull   base to the vertex without the administration of intravenous contrast.   Reformatted coronal and sagittal images were subsequently obtained and   reviewed.    COMPARISON: None    FINDINGS:  There is no CT evidence of acute transcortical infarct.    There is no hydrocephalus, mass effect, or acute intracranial hemorrhage.   No extra-axial collection. Basal cisterns are patent.    The visualized paranasal sinuses and mastoid air cells are clear.    The calvarium is intact.    IMPRESSION:  No evidence of acute transcortical infarct, acute intracranial   hemorrhage, or mass effect.    < end of copied text >

## 2023-05-03 NOTE — PROVIDER CONTACT NOTE (CRITICAL VALUE NOTIFICATION) - SITUATION
See above
WBC of 0.30 and platelet of 16k lab results provided. PA made aware. Awaiting further orders. Plan of care continues.

## 2023-05-03 NOTE — PROGRESS NOTE ADULT - SUBJECTIVE AND OBJECTIVE BOX
Chief Complaint: This is a 53y old man patient being seen in follow-up consultation for GI bleed, colitis    Interval HPI / 24H events:  Patient is seen and examined at the bedside, family at the bedside.  Reports no abdominal pain, nausea, vomiting. No hematemesis, melena reported. His hemoglobin 7.6 gm this morning. Pancytopenia.     Review of Systems:  Limited due to mental, status.     PAST MEDICAL/SURGICAL HISTORY:  Rotator cuff tear, right    Knee pain, right    Lumbar pain  Pt states that he is having an AXEL on 7/30    Nose pain    Alcohol abuse    Heroin abuse    HIV disease    S/P shoulder surgery  right 2015      MEDICATIONS  (STANDING):  buprenorphine 8 mG/naloxone 2 mG SL Film 1 Film(s) SubLingual three times a day  nystatin    Suspension 381373 Unit(s) Oral four times a day  pantoprazole  Injectable 40 milliGRAM(s) IV Push every 12 hours  phytonadione   Solution 10 milliGRAM(s) Oral daily  piperacillin/tazobactam IVPB.- 3.375 Gram(s) IV Intermittent once  piperacillin/tazobactam IVPB.. 3.375 Gram(s) IV Intermittent every 8 hours  potassium chloride  10 mEq/100 mL IVPB 10 milliEquivalent(s) IV Intermittent every 1 hour  sodium chloride 0.9%. 1000 milliLiter(s) (125 mL/Hr) IV Continuous <Continuous>    MEDICATIONS  (PRN):    No Known Allergies    T(C): 35.4 (05-03-23 @ 09:05), Max: 38.3 (05-02-23 @ 17:30)  HR: 84 (05-03-23 @ 09:05) (60 - 119)  BP: 103/63 (05-03-23 @ 09:05) (88/47 - 103/63)  RR: 18 (05-03-23 @ 09:05) (18 - 19)  SpO2: 98% (05-03-23 @ 09:05) (91% - 100%)    I&O's Summary    02 May 2023 07:01  -  03 May 2023 07:00  --------------------------------------------------------  IN: 1740 mL / OUT: 0 mL / NET: 1740 mL      PHYSICAL EXAM:   GENERAL: Minimally responsive, in no acute distress  HEENT:   sclera icteric  CHEST:  No increased effort. On supplemental O2  HEART:  Regular rhythm, no edema   ABDOMEN:  Soft, obese, + bowel sounds, no rebound tenderness,   EXTREMITIES: Jaundice, No edema  SKIN:  Warm, dry  NEURO: Open eyes to verbal stimuli, minimal verbal response.   Psych:  Calm, A&Ox0.         LABS:               6.2    0.18  )-----------( 14       ( 05-03 @ 06:50 )             19.3                7.7    0.30  )-----------( 16       ( 05-02 @ 02:58 )             23.0                6.7    0.94  )-----------( 21       ( 05-01 @ 14:55 )             20.4       05-03    135  |  100  |  56.1<H>  ----------------------------<  126<H>  3.2<L>   |  22.0  |  1.26    Ca    7.0<L>      03 May 2023 06:50  Phos  4.3     05-03  Mg     2.3     05-03    TPro  3.7<L>  /  Alb  1.8<L>  /  TBili  19.6<H>  /  DBili  >10.0<H>  /  AST  91<H>  /  ALT  61<H>  /  AlkPhos  420<H>  05-03    LIVER FUNCTIONS - ( 03 May 2023 06:50 )  Alb: 1.8 g/dL / Pro: 3.7 g/dL / ALK PHOS: 420 U/L / ALT: 61 U/L / AST: 91 U/L / GGT: x             Lipase, Serum: 24 U/L (05-01-23 @ 14:55)    PT/INR - ( 03 May 2023 06:50 )   PT: 23.1 sec;   INR: 1.98 ratio         PTT - ( 03 May 2023 06:50 )  PTT:46.4 sec  Ferritin, Serum: 61944 ng/mL *H* (05-03-23 @ 06:50)  Ferritin, Serum: 85842 ng/mL *H* (05-01-23 @ 16:44)  Iron Total, Serum: 116 ug/dL (05-01-23 @ 16:44)  Iron - Total Binding Capacity.: 132 ug/dL *L* (05-01-23 @ 16:44)  % Saturation, Iron: 88 % *H* (05-01-23 @ 16:44)    Culture - Urine (collected 01 May 2023 19:03)  Source: Clean Catch Clean Catch (Midstream)  Final Report (03 May 2023 07:46):    <10,000 CFU/mL Normal Urogenital Zoë    Culture - Blood (collected 01 May 2023 14:55)  Source: .Blood Blood-Peripheral  Preliminary Report (02 May 2023 23:02):    No growth to date.    Culture - Blood (collected 01 May 2023 14:40)  Source: .Blood Blood-Peripheral  Preliminary Report (02 May 2023 23:02):    No growth to date.      < from: US Abdomen Upper Quadrant Right (05.02.23 @ 09:01) >  FINDINGS:  Liver: Enlarged (19.5 cm) with slightly heterogeneous echotexture and   mildly nodular contour possible cirrhotic change.  Bile ducts: Normal caliber. Common bile duct measures 2 mm.  Gallbladder: No gallstones. Diffuse nonspecific wall thickening likely   reactive in this setting.  Pancreas: Not adequately visualized.  Right kidney: 11.8 cm. No hydronephrosis.  Ascites: Small right upper quadrant ascites  IVC: Visualized portions are within normal limits.    IMPRESSION:  Hepatomegaly with  possible early cirrhotic change.  No biliary dilatation.  Mild ascites.    < end of copied text >   Chief Complaint: This is a 53y old man patient being seen in follow-up consultation for GI bleed, colitis    Interval HPI / 24H events:  Patient is seen and examined at the bedside, family at the bedside.  Reports no abdominal pain, nausea, vomiting. No hematemesis, melena reported. His hemoglobin 7.7 --6.2 gm this morning. Pancytopenia.     Review of Systems:  Limited due to mental, status.     PAST MEDICAL/SURGICAL HISTORY:  Rotator cuff tear, right    Knee pain, right    Lumbar pain  Pt states that he is having an AXEL on 7/30    Nose pain    Alcohol abuse    Heroin abuse    HIV disease    S/P shoulder surgery  right 2015      MEDICATIONS  (STANDING):  buprenorphine 8 mG/naloxone 2 mG SL Film 1 Film(s) SubLingual three times a day  nystatin    Suspension 137129 Unit(s) Oral four times a day  pantoprazole  Injectable 40 milliGRAM(s) IV Push every 12 hours  phytonadione   Solution 10 milliGRAM(s) Oral daily  piperacillin/tazobactam IVPB.- 3.375 Gram(s) IV Intermittent once  piperacillin/tazobactam IVPB.. 3.375 Gram(s) IV Intermittent every 8 hours  potassium chloride  10 mEq/100 mL IVPB 10 milliEquivalent(s) IV Intermittent every 1 hour  sodium chloride 0.9%. 1000 milliLiter(s) (125 mL/Hr) IV Continuous <Continuous>    MEDICATIONS  (PRN):    No Known Allergies    T(C): 35.4 (05-03-23 @ 09:05), Max: 38.3 (05-02-23 @ 17:30)  HR: 84 (05-03-23 @ 09:05) (60 - 119)  BP: 103/63 (05-03-23 @ 09:05) (88/47 - 103/63)  RR: 18 (05-03-23 @ 09:05) (18 - 19)  SpO2: 98% (05-03-23 @ 09:05) (91% - 100%)    I&O's Summary    02 May 2023 07:01  -  03 May 2023 07:00  --------------------------------------------------------  IN: 1740 mL / OUT: 0 mL / NET: 1740 mL      PHYSICAL EXAM:   GENERAL: Minimally responsive, in no acute distress  HEENT:   sclera icteric  CHEST:  No increased effort. On supplemental O2  HEART:  Regular rhythm, no edema   ABDOMEN:  Soft, obese, + bowel sounds, no rebound tenderness,   EXTREMITIES: Jaundice, No edema  SKIN:  Warm, dry  NEURO: Open eyes to verbal stimuli, minimal verbal response.   Psych:  Calm, A&Ox0.         LABS:               6.2    0.18  )-----------( 14       ( 05-03 @ 06:50 )             19.3                7.7    0.30  )-----------( 16       ( 05-02 @ 02:58 )             23.0                6.7    0.94  )-----------( 21       ( 05-01 @ 14:55 )             20.4       05-03    135  |  100  |  56.1<H>  ----------------------------<  126<H>  3.2<L>   |  22.0  |  1.26    Ca    7.0<L>      03 May 2023 06:50  Phos  4.3     05-03  Mg     2.3     05-03    TPro  3.7<L>  /  Alb  1.8<L>  /  TBili  19.6<H>  /  DBili  >10.0<H>  /  AST  91<H>  /  ALT  61<H>  /  AlkPhos  420<H>  05-03    LIVER FUNCTIONS - ( 03 May 2023 06:50 )  Alb: 1.8 g/dL / Pro: 3.7 g/dL / ALK PHOS: 420 U/L / ALT: 61 U/L / AST: 91 U/L / GGT: x             Lipase, Serum: 24 U/L (05-01-23 @ 14:55)    PT/INR - ( 03 May 2023 06:50 )   PT: 23.1 sec;   INR: 1.98 ratio         PTT - ( 03 May 2023 06:50 )  PTT:46.4 sec  Ferritin, Serum: 66341 ng/mL *H* (05-03-23 @ 06:50)  Ferritin, Serum: 11325 ng/mL *H* (05-01-23 @ 16:44)  Iron Total, Serum: 116 ug/dL (05-01-23 @ 16:44)  Iron - Total Binding Capacity.: 132 ug/dL *L* (05-01-23 @ 16:44)  % Saturation, Iron: 88 % *H* (05-01-23 @ 16:44)    Culture - Urine (collected 01 May 2023 19:03)  Source: Clean Catch Clean Catch (Midstream)  Final Report (03 May 2023 07:46):    <10,000 CFU/mL Normal Urogenital Zoë    Culture - Blood (collected 01 May 2023 14:55)  Source: .Blood Blood-Peripheral  Preliminary Report (02 May 2023 23:02):    No growth to date.    Culture - Blood (collected 01 May 2023 14:40)  Source: .Blood Blood-Peripheral  Preliminary Report (02 May 2023 23:02):    No growth to date.      < from: US Abdomen Upper Quadrant Right (05.02.23 @ 09:01) >  FINDINGS:  Liver: Enlarged (19.5 cm) with slightly heterogeneous echotexture and   mildly nodular contour possible cirrhotic change.  Bile ducts: Normal caliber. Common bile duct measures 2 mm.  Gallbladder: No gallstones. Diffuse nonspecific wall thickening likely   reactive in this setting.  Pancreas: Not adequately visualized.  Right kidney: 11.8 cm. No hydronephrosis.  Ascites: Small right upper quadrant ascites  IVC: Visualized portions are within normal limits.    IMPRESSION:  Hepatomegaly with  possible early cirrhotic change.  No biliary dilatation.  Mild ascites.    < end of copied text >

## 2023-05-03 NOTE — BH CONSULTATION LIAISON ASSESSMENT NOTE - NSBHCHARTREVIEWVS_PSY_A_CORE FT
Vital Signs Last 24 Hrs  T(C): 36.8 (03 May 2023 15:55), Max: 38.3 (02 May 2023 17:30)  T(F): 98.2 (03 May 2023 15:55), Max: 100.9 (02 May 2023 17:30)  HR: 125 (03 May 2023 15:55) (60 - 125)  BP: 113/68 (03 May 2023 15:55) (88/47 - 119/66)  BP(mean): 69 (02 May 2023 22:06) (64 - 69)  RR: 18 (03 May 2023 15:55) (18 - 20)  SpO2: 96% (03 May 2023 15:55) (92% - 100%)    Parameters below as of 03 May 2023 15:55  Patient On (Oxygen Delivery Method): nasal cannula  O2 Flow (L/min): 2

## 2023-05-03 NOTE — BH CONSULTATION LIAISON ASSESSMENT NOTE - SUMMARY
Patient is a 53 year old male who is unemployed, living in a rented room alone, with a history of depression as well as alcohol and opioid use disorder and a PMH of HIV who presented with weakness, AMS and jaundice. Psychiatry called to help with medication management     Patient seen and evaluated and found to be calm but confused while oriented to person only and as per staff report, with a fluctuating mental status   Chart reviewed and as per med rec, patient prescribed Suboxone 8/2 TID, ZYprexa 10mg, with other scripts in recent including Prozac, Paxil, ZOloft and trazodone. Attempt made to call patients Psych NP at [facility] and message left to call back     Dx. Delirium     Recs   -Hold Zyprexa and antidepressant for now   -Continue Suboxone   -Unlikely alcohol withdrawal based on history  -Maintain delirium precautions   -Avoid anticholinergic agents, benzos, opioid as they can further perpetuate confusion   -Frequent re orientation, Hydration, try to avoid restraints and if possible, mobilize patient and PT involvement  -will follow

## 2023-05-03 NOTE — CONSULT NOTE ADULT - CONVERSATION DETAILS
- pt is confused so spoke to his 3 sisters at  with  #[id number]  - They shared that pt is unmarried, has no children, parents are .  He has 5 siblings - 3 sisters (Arleth, Arturo, and Sil) and 2 brothers (Lamont and Tejas) - all are local except Tejas who lives in [place].  I explained that legally and in absence of HCP, all 5 of them are equal surrogates.  My assessment was that pt was too confused at this time to even appoint a HCP.    - Sisters are interested in pursuing BM Bx and any other w/u recommended to investigate the cause(s) of his pancytopenia and hepatic dysfxn.

## 2023-05-03 NOTE — PROGRESS NOTE ADULT - NS ATTEND AMEND GEN_ALL_CORE FT
Patient seen and examined.  Appears better than yesterday.  Patient is pancytopenic with evidence of significantly elevated LFTs and coagulopathy.  Coagulopathy has improved with supplementation.  He is being transfused.  Family was at bedside and updated about the plan.  Needs hematology evaluation which has already requested bone marrow biopsy.  Whether additionally the patient needs any liver biopsy.  Also the patient is not a candidate for liver biopsy currently.  Continue vitamin K, thiamine, folate.  Nutritional support.

## 2023-05-03 NOTE — PROVIDER CONTACT NOTE (CHANGE IN STATUS NOTIFICATION) - DATE AND TIME:
02-May-2023 20:38 Render Risk Assessment In Note?: no Note Text (......Xxx Chief Complaint.): This diagnosis correlates with the Detail Level: Simple Other (Free Text): Discussed another vbeam treatment to left nasal tip.

## 2023-05-03 NOTE — PROGRESS NOTE ADULT - ASSESSMENT
53 year old male with PMHx , HIV, ETOH abuse (last drink 3 years ago), opiate abuse (last use 1 year ago, has been on suboxone) presenting for evaluation of 2 weeks of worsening jaundice, decreased PO, weight loss, altered mental status.

## 2023-05-03 NOTE — BH CONSULTATION LIAISON ASSESSMENT NOTE - NSBHCHARTREVIEWLAB_PSY_A_CORE FT
Basic Metabolic Panel (06.22.18 @ 09:08)    Sodium, Serum: 137 mmol/L    Potassium, Serum: 4.0 mmol/L    Chloride, Serum: 100 mmol/L    Carbon Dioxide, Serum: 18.0 mmol/L    Anion Gap, Serum: 19 mmol/L    Blood Urea Nitrogen, Serum: 7.0 mg/dL    Creatinine, Serum: 0.66 mg/dL    Glucose, Serum: 103 mg/dL    Calcium, Total Serum: 9.6 mg/dL    eGFR if Non : 116: Interpretative comment  The units for eGFR are ml/min/1.73m2 (normalized body surface area). The  eGFR is calculated from a serum creatinine using the CKD-EPI equation.  Other variables required for calculation are race, age and sex. Among  patients with chronic kidney disease (CKD), the eGFR is useful in  determining the stage of disease according to KDOQI CKD classification.  All eGFR results are reported numerically with the following  interpretation.          GFR                    With                 Without     (ml/min/1.73 m2)    Kidney Damage       Kidney Damage        >= 90                    Stage 1                     Normal        60-89                    Stage 2                     Decreased GFR        30-59     Stage 3                     Stage 3        15-29                    Stage 4                     Stage 4        < 15                      Stage 5                     Stage 5  Each stage of CKD assumes that the associated GFR level has been in  effect for at least 3 months. Determination of stages one and two (with  eGFR > 59 ml/min/m2) requires estimation of kidney damage for at least 3  months as defined by structural or functional abnormalities.  Limitations: All estimates of GFR will be less accurate for patients at  extremes of muscle mass (including but not limited to frail elderly,  critically ill, or cancer patients), those with unusual diets, and those  with conditions associated with reduced secretion or extrarenal  elimination of creatinine. The eGFR equation is not recommended for use  in patients with unstable creatinine levels. mL/min/1.73M2    eGFR if African American: 134 mL/min/1.73M2

## 2023-05-03 NOTE — PROGRESS NOTE ADULT - SUBJECTIVE AND OBJECTIVE BOX
[facility] Division of Hospital Medicine    Chief Complaint:    SUBJECTIVE / OVERNIGHT EVENTS:  Patient transferred to [facility] overnight  conversational but intermittently confused/tangiential     Patient denies chest pain, SOB, abd pain, N/V, fever, chills, dysuria or any other complaints. All remainder ROS negative.     MEDICATIONS  (STANDING):  buprenorphine 8 mG/naloxone 2 mG SL Film 1 Film(s) SubLingual three times a day  fluconAZOLE IVPB      lactulose Retention Enema 200 Gram(s) Rectal once  lactulose Syrup 20 Gram(s) Oral three times a day  pantoprazole  Injectable 40 milliGRAM(s) IV Push every 12 hours  phytonadione   Solution 10 milliGRAM(s) Oral daily  piperacillin/tazobactam IVPB.. 3.375 Gram(s) IV Intermittent every 8 hours  rifAXIMin 550 milliGRAM(s) Oral two times a day  sodium chloride 0.9% Bolus 1000 milliLiter(s) IV Bolus once  sodium chloride 0.9%. 1000 milliLiter(s) (125 mL/Hr) IV Continuous <Continuous>  sodium chloride 3%  Inhalation 4 milliLiter(s) Inhalation every 4 hours  thiamine IVPB 500 milliGRAM(s) IV Intermittent every 8 hours    MEDICATIONS  (PRN):  LORazepam   Injectable 1 milliGRAM(s) IV Push every 1 hour PRN CIWA-Ar score 8 or greater        I&O's Summary    02 May 2023 07:01  -  03 May 2023 07:00  --------------------------------------------------------  IN: 1740 mL / OUT: 0 mL / NET: 1740 mL        PHYSICAL EXAM:  Vital Signs Last 24 Hrs  T(C): 36.8 (03 May 2023 15:55), Max: 38.3 (02 May 2023 17:30)  T(F): 98.2 (03 May 2023 15:55), Max: 100.9 (02 May 2023 17:30)  HR: 125 (03 May 2023 15:55) (78 - 125)  BP: 113/68 (03 May 2023 15:55) (88/47 - 119/66)  BP(mean): 69 (02 May 2023 22:06) (64 - 69)  RR: 18 (03 May 2023 15:55) (18 - 20)  SpO2: 96% (03 May 2023 15:55) (92% - 100%)    Parameters below as of 03 May 2023 15:55  Patient On (Oxygen Delivery Method): nasal cannula  O2 Flow (L/min): 2          CONSTITUTIONAL: NAD, ill appearing   ENMT: Moist oral mucosa, no pharyngeal injection or exudates; normal dentition  RESPIRATORY: Normal respiratory effort; clear to auscultation bilaterally  CARDIOVASCULAR: Regular rate and rhythm, normal S1 and S2, no murmur/rub/gallop; Peripheral pulses are 2+ bilaterally  ABDOMEN: Nontender to palpation, normoactive bowel sounds, no rebound/guarding;   MUSCLOSKELETAL:  No clubbing or cyanosis of digits; no joint swelling or tenderness to palpation  PSYCH: A+O to person, place, and time; affect appropriate  NEUROLOGY: CN 2-12 are intact and symmetric; no gross sensory deficits; + Upper extremity tremor  SKIN: No rashes; no palpable lesions    LABS:                        6.2    0.18  )-----------( 14       ( 03 May 2023 06:50 )             19.3     05-03    135  |  100  |  56.1<H>  ----------------------------<  126<H>  3.2<L>   |  22.0  |  1.26    Ca    7.0<L>      03 May 2023 06:50  Phos  4.3     05-03  Mg     2.3     05-03    TPro  3.7<L>  /  Alb  1.8<L>  /  TBili  19.6<H>  /  DBili  >10.0<H>  /  AST  91<H>  /  ALT  61<H>  /  AlkPhos  420<H>  05-03    PT/INR - ( 03 May 2023 06:50 )   PT: 23.1 sec;   INR: 1.98 ratio         PTT - ( 03 May 2023 06:50 )  PTT:46.4 sec      Urinalysis Basic - ( 01 May 2023 19:03 )    Color: Yellow / Appearance: Clear / S.010 / pH: x  Gluc: x / Ketone: Negative  / Bili: Large / Urobili: 12 mg/dL   Blood: x / Protein: Negative / Nitrite: Negative   Leuk Esterase: Trace / RBC: 3-5 /HPF / WBC 3-5 /HPF   Sq Epi: x / Non Sq Epi: x / Bacteria: Few        Culture - Urine (collected 01 May 2023 19:03)  Source: Clean Catch Clean Catch (Midstream)  Final Report (03 May 2023 07:46):    <10,000 CFU/mL Normal Urogenital Zoë    Culture - Blood (collected 01 May 2023 14:55)  Source: .Blood Blood-Peripheral  Preliminary Report (02 May 2023 23:02):    No growth to date.    Culture - Blood (collected 01 May 2023 14:40)  Source: .Blood Blood-Peripheral  Preliminary Report (02 May 2023 23:02):    No growth to date.      CAPILLARY BLOOD GLUCOSE            RADIOLOGY & ADDITIONAL TESTS:  Results Reviewed:   Imaging Personally Reviewed:  Electrocardiogram Personally Reviewed:

## 2023-05-03 NOTE — CONSULT NOTE ADULT - ASSESSMENT
52yo M w/ PMH of HIV, EtOH use disorder (last drink reportedly 3 yrs ago), OUD on suboxone (last use reportedly 1 yr ago) who p/w worsening jaundice, wt loss, AMS.  Per family, pt was started on multiple ?psych medications a few wks PTA and subsequently began to decompensate and became increasingly confused.  In ED, pt found to be pancytopenic, FOBT+, LFT derangements including Tbili=19.6 and albumin=1.8.  Pt with low-grade fever (Lznk=154.9).  Pt has undetectable HIV viral load.  CT shows B/L pulmonary tree-in-bud opacities, poral HTN w/ splenomegaly and ?splenic infarct, moderate ascites, rt-side colitis, and retroperitoneal/pelvic LAD. Abd U/S revealed hepatomegaly with  possible early cirrhotic change, no biliary dilatation and mild ascites.  Oncology c/s'd and recommend BM bx.  We are consulted for assistance with advance directives.  #ACP  - pt w/ pancytopenia and cholestatic picture of unclear etiology - oncology, ID, GI consulted - no terminal dx established at this time as HIV viral load undetectable and awaiting workup  - iSl Leos listed as contact - unclear what relation - [phone]      #OUD  - ISTOP reviewed - Reference #: [id number] - appears pt Rx'd suboxone 8-2mg as far back as 5/2022 and appears Rx was possibly changed from TID to daily frequency on 4/25/23 by prescriber Allie Humphrey?  - primary team has resumed at [facility] - would recommend contact suboxone prescriber for collateral information 54yo M w/ PMH of HIV, EtOH use disorder (last drink reportedly 3 yrs ago), OUD on suboxone (last use reportedly 1 yr ago) who p/w worsening jaundice, wt loss, AMS.  Per family, pt was started on multiple ?psych medications a few wks PTA and subsequently began to decompensate and became increasingly confused.  In ED, pt found to be pancytopenic, FOBT+, LFT derangements including Tbili=19.6 and albumin=1.8.  Pt with low-grade fever (Tyzw=905.9).  Pt has undetectable HIV viral load.  CT shows B/L pulmonary tree-in-bud opacities, poral HTN w/ splenomegaly and ?splenic infarct, moderate ascites, rt-side colitis, and retroperitoneal/pelvic LAD. Abd U/S revealed hepatomegaly with  possible early cirrhotic change, no biliary dilatation and mild ascites.  Oncology c/s'd and recommend BM bx.  We are consulted for assistance with advance directives.  #ACP  - pt w/ pancytopenia and cholestatic picture of unclear etiology - oncology, ID, GI consulted - no terminal dx established at this time as HIV viral load undetectable and awaiting workup  - pt is confused so spoke to his 3 sisters at  with  #[id number]  - They shared that pt is unmarried, has no children, parents are .  He has 5 siblings - 3 sisters (Arleth, Arturo, and Sil) and 2 brothers (Lamont and Tejas) - all are local except Tejas who lives in [place].  I explained that legally and, in absence of an HCP, all 5 siblings are equal surrogates.  My assessment was that pt was too confused at this time to even appoint a HCP.    - Sisters are interested in pursuing BM Bx and any other w/u recommended to investigate the cause(s) of his pancytopenia and hepatic dysfxn.    - Sil Leos listed as contact - [phone]  - Will cont to follow and remain available to assist with code status and broader goals of care discussions once a diagnosis is established allowing for prognostication.    #AMS  - likely PSE in view of his LFTs  - GI following - consider lactulose/rifaximin  - In order to mitigate the risk of delirium, would avoid/minimize known deliriogenic drugs such as benzodiazepines and anticholinergics.  Encourage staff and family to reorient frequently.  Keep shades open during day in effort to maintain day/night cycle.    #OUD  - ISTOP reviewed - Reference #: [id number] - appears pt Rx'd suboxone 8-2mg as far back as 2022 and appears Rx was possibly changed from TID to daily frequency on 23 by prescriber Allie Humphrey?  - primary team has resumed at [facility] - would recommend contact suboxone prescriber for collateral information

## 2023-05-03 NOTE — PROGRESS NOTE ADULT - PROBLEM SELECTOR PLAN 1
Pancytopenia. coagulopathy, elevated INR. encephalopathy. Elevated LDH, D bili. Hepatitis panel negative.   Hemoglobin 7.7 this morning. No hematemesis, melena or hematochezia reported.   Vitamin K 10 mg IV cx 3 doses.   Hematology consult  ID consult   Continue to trend CBC ,transfuse as needed. 1 unit PRBC ordered for 7.7 gm today  Lactulose titrated to 2-3 soft bowel movements per day, avoid diarrhea  Rifaximin 550mg for HE  Continue to trend LFTs   Avoid NSAIDs, hepatotoxic drugs  MVI, thiamine and folic acid. Optimize nutrition.  Need further work up including liver biopsy, bone marrow biopsy   Will discuss case with our hepatologist Dr. Smith for further recommendations.    Work up to r/o autoimmune hepatitis. Pancytopenia. coagulopathy, elevated INR. encephalopathy. Elevated LDH, D bili. Hepatitis panel negative.   Hemoglobin 7.7-->6.6 this morning. No hematemesis, melena or hematochezia reported.   Vitamin K 10 mg IV cx 3 doses.   Hematology consult  ID consult   Continue to trend CBC ,transfuse as needed. 1 unit PRBC ordered for 6.6 gm today  Lactulose titrated to 2-3 soft bowel movements per day, avoid diarrhea  Rifaximin 550mg for HE  Continue to trend LFTs   Avoid NSAIDs, hepatotoxic drugs  MVI, thiamine and folic acid. Optimize nutrition.  Need further work up including liver biopsy, bone marrow biopsy   Will discuss case with our hepatologist Dr. Smith for further recommendations.    Work up to r/o autoimmune hepatitis.

## 2023-05-03 NOTE — PROVIDER CONTACT NOTE (CHANGE IN STATUS NOTIFICATION) - ASSESSMENT
Dad is calling questioning [employer] contact lens prescription   Pt is awake but confused. Accompanied by siblings at bedside. Generalized jaundiced and abdominal distention noted.

## 2023-05-03 NOTE — CONSULT NOTE ADULT - ASSESSMENT
{\rtf1\gmiovs81481\ansi\nvaggjy4945\ftnbj\uc1\deff0  {\fonttbl{\f0 \fnil Segoe UI;}{\f1 \fnil \fcharset0 Segoe UI;}{\f2 \fnil Times New Cristi;}}  {\colortbl ;\gvg889\buimb782\bjmx873 ;\red0\green0\blue0 ;\red0\green0\qkth716 ;\red0\green0\blue0 ;}  {\stylesheet{\f0\fs20 Normal;}{\cs1 Default Paragraph Font;}{\cs2\f0\fs16 Line Number;}{\cs3\f2\fs24\ul\cf3 Hyperlink;}}  {\*\revtbl{Unknown;}}  \dpcdxm41809\fdplzp11133\areen3163\vzjai5630\ypwso3578\nniph5807\jnplqiz345\nehdqmk100\nogrowautofit\jyiuxl799\formshade\nofeaturethrottle1\dntblnsbdb\fet4\aendnotes\aftnnrlc\pgbrdrhead\pgbrdrfoot  \sectd\aeddsm21379\qshmtz26903\guttersxn0\jreizgvk8074\gadiblvm8537\hasjegae3040\axyrfhga2502\obuiqpo802\rhfsoyk004\sbkpage\pgncont\pgndec  \plain\plain\f0\fs24\ql\plain\f0\fs24{\*\bkmkstart [id number]}{\*\bkmkend [id number]}\plain\f0\fs20\taps0368\hich\f0\dbch\f0\loch\f0\fs20 53 year old man with history of HIV on treatment admitted for altered mental status. Labs reveal severe pancytopenia,Peripheral   smear examined, which showed pancytopenia. WBCs were not dysplastic appearing. No nucleated RBCs were present. There were no schistocytes. The patient noted to have elevated t.bili, elevated LDH, elevated ferritin that continues to rise. Imaging shows   hepatomegaly and splenomegaly with evidence of early cirrhosis. The differential for the patient's pancytopenia is wide and includes underlying BM failure from malignancy, HLH, medications, drug use, and acute infection in the setting of HIV. Furthermore,   we have been unable to get in touch with his HIV doctor to understand more about his HIV status (when he started on treatment, counts prior to admission) as this would inform how compromised his marrow was prior to this acute event. \par  - I discussed this case with Dr. Toñito Lima as HLH and hematologic malignancies are on the differential. He has been accepted to [address] [place] [address] at [facility] under Dr. Garcia and can be transferred when stable. This was discussed with Dr. Barlow. \par  -BM ordered and schedule, but will defer for now as Dr. Lima has requested this be done after transfer. If there is a delay in transfer will do BM biopsy here at [facility]. \par  - please trend reticulocyte count, CBC, ferritin, fibrinogen, haptoglobin, PT/PTT daily for now \par  -  soluble CD25, and NK Cell activity pending. \par  \plain\f1\fs20\ueuh3752\hich\f1\dbch\f1\loch\f1\cf2\fs20\strike\plain\f1\fs20\ytke6695\hich\f1\dbch\f1\loch\f1\cf2\fs20\plain\f0\fs20\ccgf8420\hich\f0\dbch\f0\loch\f0\fs20\par  }   53 year old man with history of HIV on treatment admitted for altered mental status. Labs reveal severe pancytopenia,Peripheral smear examined, which showed pancytopenia. WBCs were not dysplastic appearing. No nucleated RBCs were present. There were no schistocytes. The patient noted to have elevated t.bili, elevated LDH, elevated ferritin that continues to rise. Imaging shows hepatomegaly and splenomegaly with evidence of early cirrhosis. The differential for the patient's pancytopenia is wide and includes underlying BM failure from malignancy, HLH, medications, drug use, and acute infection in the setting of HIV. Furthermore, we have been unable to get in touch with his HIV doctor to understand more about his HIV status (when he started on treatment, counts prior to admission) as this would inform how compromised his marrow was prior to this acute event.   - I discussed this case with Dr. Lima and Dr. Garcia as HLH and hematologic malignancies are on the differential; we agreed that we would not start patient on dexamethasone/etoposide at the moment as patient meets 4 criteria for HLH and is at high risk for adverse effects   - BM biopsy is scheduled.   - please trend reticulocyte count, CBC, ferritin, fibrinogen, haptoglobin, PT/PTT daily for now   - Soluble CD25, and NK Cell activity pending.

## 2023-05-03 NOTE — PROVIDER CONTACT NOTE (CHANGE IN STATUS NOTIFICATION) - BACKGROUND
Pt is admitted for Sepsis, AMS, Jaundice and S/P Fall. Pt has a history of HIV+, ETOH/Opiates abuse.

## 2023-05-03 NOTE — PROGRESS NOTE ADULT - ASSESSMENT
52 y/o M with history of HIV, depression, substance and alcohol abuse. History is very limited, as family and patient are unable to provide much information. Per sister, patient lives alone in a shared apartment; he was last seen in his usual state of health about 2-3 weeks ago. It appears he was started on a new medication for depression, but his sister trashed all the prescription bottles in the garbage; she is unable to recall names or prescriber. Unknown HIV treatment, compliance or history of opportunistic infections (followed at [facility]?)    - Profound pancytopenia, marked hyperbilirubinemia, coagulopathy, altered LFTs and elevated ferritin  - Differential diagnosis is broad including HLH, malignancy; if AIDS disseminated mycobacterial or fungal infection, viral coinfection    - HIV viral load undetectable     - Unknown prior VL and CD4 counts; unknown history of compliance and OIs     - undetectable VL suggest that he was taking ART     - attempts to contact [facility] unsuccessful     - T cell count unreliable in the setting of pancytopenia   - NH3 normal   - Acute hepatitis panel neg   - CTH unremarkable would check MRI brain w/con  - Check AFB urine culture   - Check GI PCR   - Check Stool Cx, O&P, stool AFB  - Follow mycobacterial blood culture  - Follow Parvovirus B12 PCR  - Follow EBV PCR  - Follow CMV PCR  - Follow histoplasma AG   - Follow toxoplasma AB  - Follow Fungitell and AGM  - Follow syphilis screen   - Follow crypto serum AG   - If able, induced AFB sputum x 3   - BM Bx being coordinated   - UDS + THC   - On empiric piperacillin-tazobactam for colitis + neutropenia   - Patient with oral thrush; he probably has esophageal candidiasis but unable to assess for symptoms      - will add fluconazole   - No objections to steroids if needed from Hem/onc perspective   - Prognosis guarded  - Airborne precautions     D/w Dr. Barlow, Dr. Frey and Dr. Armendariz   d/w family at bedside  52 y/o M with history of HIV, depression, substance and alcohol abuse. History is very limited, as family and patient are unable to provide much information. Per sister, patient lives alone in a shared apartment; he was last seen in his usual state of health about 2-3 weeks ago. It appears he was started on a new medication for depression, but his sister trashed all the prescription bottles in the garbage; she is unable to recall names or prescriber. Unknown HIV treatment, compliance or history of opportunistic infections (followed at [facility]?)    - Profound pancytopenia, marked hyperbilirubinemia, coagulopathy, altered LFTs and elevated ferritin  - Differential diagnosis is broad including HLH, malignancy; if AIDS disseminated mycobacterial or fungal infection, viral coinfection    - HIV viral load undetectable     - Unknown prior VL and CD4 counts; unknown history of compliance and OIs     - undetectable VL suggest that he was taking ART     - attempts to contact [facility] unsuccessful     - T cell count unreliable in the setting of pancytopenia   - NH3 normal   - Acute hepatitis panel neg   - CTH unremarkable would check MRI brain w/con  - Check AFB urine culture   - Check GI PCR   - Check Stool Cx, O&P, stool AFB  - Follow mycobacterial blood culture  - Follow Parvovirus B12 PCR  - Follow EBV PCR  - Follow CMV PCR  - Follow histoplasma AG   - Follow toxoplasma AB  - Follow Fungitell and AGM  - Follow syphilis screen   - Follow crypto serum AG   - If able, induced AFB sputum x 3   - BM Bx being coordinated   - UDS + THC   - On empiric piperacillin-tazobactam for colitis + neutropenia   - Patient with oral thrush; he probably has esophageal candidiasis but unable to assess for symptoms      - will add fluconazole; discontinue nystatin   - No objections to steroids if needed from Hem/onc perspective   - Prognosis guarded  - Airborne precautions     D/w Dr. Barlow, Dr. Frey and Dr. Armendariz   d/w family at bedside

## 2023-05-03 NOTE — BH CONSULTATION LIAISON ASSESSMENT NOTE - CURRENT MEDICATION
MEDICATIONS  (STANDING):  buprenorphine 8 mG/naloxone 2 mG SL Film 1 Film(s) SubLingual three times a day  fluconAZOLE IVPB      pantoprazole  Injectable 40 milliGRAM(s) IV Push every 12 hours  phytonadione   Solution 10 milliGRAM(s) Oral daily  piperacillin/tazobactam IVPB.. 3.375 Gram(s) IV Intermittent every 8 hours  sodium chloride 0.9%. 1000 milliLiter(s) (125 mL/Hr) IV Continuous <Continuous>  sodium chloride 3%  Inhalation 4 milliLiter(s) Inhalation every 4 hours    MEDICATIONS  (PRN):

## 2023-05-04 NOTE — BH CONSULTATION LIAISON PROGRESS NOTE - NSBHCHARTREVIEWLAB_PSY_A_CORE FT
9.1    0.28  )-----------( 7        ( 04 May 2023 07:00 )             27.5   05-04    142  |  109<H>  |  55.4<H>  ----------------------------<  100<H>  3.6   |  18.0<L>  |  1.12    Ca    7.1<L>      04 May 2023 07:00  Phos  4.6     05-04  Mg     2.2     05-04    TPro  3.9<L>  /  Alb  2.2<L>  /  TBili  21.5<H>  /  DBili  >10.0<H>  /  AST  125<H>  /  ALT  68<H>  /  AlkPhos  451<H>  05-04

## 2023-05-04 NOTE — PROGRESS NOTE ADULT - ASSESSMENT
53 year old man with history of HIV on treatment admitted for altered mental status. Labs reveal severe pancytopenia,Peripheral smear examined, which showed pancytopenia. WBCs were not dysplastic appearing. No nucleated RBCs were present. There were no schistocytes. The patient noted to have elevated t.bili, elevated LDH, elevated ferritin that continues to rise. Imaging shows hepatomegaly and splenomegaly with evidence of early cirrhosis. The differential for the patient's pancytopenia is wide and includes underlying BM failure from malignancy, HLH, medications, drug use, and acute infection in the setting of HIV. As per review with primary ID doctor, it does appear that he has had a history of worsening pancytopenia over the last year. Further discussion with his sister reveals a history of jaundice a couple years ago, although it is unclear what the etiology of this jaundice was.     The patient is in fulminant liver failure of which the etiology is unclear. I discussed this with his sister and walker who are understanding that his prognosis is poor and that his condition continues to deteriorate I explained that the differential for his liver failure, like his BM failure is wide. I explained that there is a possibility that his liver failure could be secondary to malignancy and HLH. However, his fulminant liver failure likely precludes him from treatment with chemotherapy if a malignancy was to be discovered or if he indeed had HLH. Additional treatment for HLH and some malignancies includes high dose steroids. I explained that infectious causes remain in the differential and that steroids could potentially worsen an underlying infection. They are understanding and are willing to proceed with treatment.     - start dexamethasone 20 mg.   - No etoposide for now as patient is at high risk for decompensation from treatment given fulminant liver failure.   - BM biopsy is currently on hold given rapid decompensation.   - please trend reticulocyte count, CBC, ferritin, fibrinogen, haptoglobin, PT/PTT daily for now   - Soluble CD25, and NK Cell activity pending.

## 2023-05-04 NOTE — PROGRESS NOTE ADULT - CONVERSATION DETAILS
Patient without capacity 2/2 AMS     Extensive family discussion explaining patient's worsening  condition - Respiratory failure, profound pancytopenia, liver failure  We reviewed his past medical condition and how this  will have an affect on his ability to recover. Family reports the clinic kept calling him to follow up on abnormal labs since  March for which he did not.  Prepared family patient is in respiratory failure and may need intubation. Family does not want him intubated, for which it was explained that without intubation, he can die. However, given his current condition, it was explained, CPR, intubation and mechanical ventilation would have  limited benefit in consideration of patient's current condition.    Given there are multiple family members, they were requested to designate a point person to make decisions when they have had time to discuss things.

## 2023-05-04 NOTE — PROGRESS NOTE ADULT - SUBJECTIVE AND OBJECTIVE BOX
CC:  Follow up GOC , Symptoms    OVERNIGHT EVENTS:  worsening respiratory failure    Present Symptoms:   Dyspnea: Mod   Severe     Nausea/Vomiting:  No    Anxiety:  No     Depression:  Unable  Fatigue:  Yes     Loss of appetite:  yes    Constipation: Not Reported    Pain:  No Signs            Location            Duration            Character            Severity            Factors            Effect    Pain AD Score:  [email or web address] (press ctrl + left click to view)    Review of Systems: Reviewed as above  Further ROS unable to  obtain due to poor mentation       MEDICATIONS  (STANDING):  buprenorphine 8 mG/naloxone 2 mG SL Film 1 Film(s) SubLingual three times a day  fluconAZOLE IVPB      fluconAZOLE IVPB 200 milliGRAM(s) IV Intermittent every 24 hours  lactulose Syrup 20 Gram(s) Oral three times a day  pantoprazole  Injectable 40 milliGRAM(s) IV Push every 12 hours  phytonadione   Solution 10 milliGRAM(s) Oral daily  piperacillin/tazobactam IVPB.. 3.375 Gram(s) IV Intermittent every 8 hours  rifAXIMin 550 milliGRAM(s) Oral two times a day  thiamine IVPB 500 milliGRAM(s) IV Intermittent every 8 hours    MEDICATIONS  (PRN):  LORazepam   Injectable 1 milliGRAM(s) IV Push every 1 hour PRN CIWA-Ar score 8 or greater      PHYSICAL EXAM:    Vital Signs Last 24 Hrs  T(C): 36.4 (04 May 2023 08:00), Max: 38.8 (03 May 2023 23:20)  T(F): 97.5 (04 May 2023 08:00), Max: 101.8 (03 May 2023 23:20)  HR: 79 (04 May 2023 10:00) (79 - 125)  BP: 96/69 (04 May 2023 10:00) (96/69 - 121/73)  BP(mean): 82 (04 May 2023 06:00) (79 - 89)  RR: 42 (04 May 2023 10:00) (17 - 48)  SpO2: 98% (04 May 2023 10:00) (91% - 98%)    Parameters below as of 04 May 2023 10:00  Patient On (Oxygen Delivery Method): mask, nonrebreather  O2 Flow (L/min): 12      Karnofsky:20  %  General:   M jaundiced dyspneic     HEENT:  NCAT   NRB  Lungs: labored  CV:  RR  GI:  soft NTD  MSK: bedbuound  Skin:  warm/dry  Neuro somnolent    LABS:                          9.1    0.28  )-----------( 7        ( 04 May 2023 07:00 )             27.5     05-04    142  |  109<H>  |  55.4<H>  ----------------------------<  100<H>  3.6   |  18.0<L>  |  1.12    Ca    7.1<L>      04 May 2023 07:00  Phos  4.6     05-04  Mg     2.2     05-04    TPro  3.9<L>  /  Alb  2.2<L>  /  TBili  21.5<H>  /  DBili  >10.0<H>  /  AST  125<H>  /  ALT  68<H>  /  AlkPhos  451<H>  05-04    PT/INR - ( 04 May 2023 07:00 )   PT: 20.7 sec;   INR: 1.77 ratio         PTT - ( 04 May 2023 07:00 )  PTT:44.7 sec    I&O's Summary    03 May 2023 07:01  -  04 May 2023 07:00  --------------------------------------------------------  IN: 750 mL / OUT: 920 mL / NET: -170 mL        RADIOLOGY & ADDITIONAL STUDIES:  Imaging Reviewed  (   )   < from: Xray Chest 1 View- PORTABLE-Urgent (Xray Chest 1 View- PORTABLE-Urgent .) (05.04.23 @ 05:47) >    ACC: [id number] EXAM:  XR CHEST PORTABLE URGENT 1V   ORDERED BY: JERO MARTINEZ     PROCEDURE DATE:  05/04/2023          INTERPRETATION:  AP chest on May 4, 2023 at 5:30 AM. Patient is   desaturating.    Heart size normal.    There is a persistent infiltrate emanating off the right hilum similar to   May 1.    There may also be developing left base infiltrate at this time.    IMPRESSION: Persistent right lung infiltrate. Developing small left base   infiltrate.    --- End of Report ---      < end of copied text >      < from: CT Chest w/ IV Cont (05.01.23 @ 17:45) >  IMPRESSION:    Bilateral micronodular and tree-in-bud nodular opacities suggestive of   infectious/inflammatory small airway disease.    Portal hypertension, including splenomegaly and suggestion of splenic   infarct inferior pole.    Moderate volume of abdominal pelvic ascites.    Right-sided colitis, may be associated with portal colopathy versus   infectious or ischemic colitis.    Retroperitoneal/pelvic lymphadenopathy.    Other findings as discussed above.    --- End of Report ---    < end of copied text >        ADVANCE DIRECTIVE PREFERENCES    Full Code  DNR/I  and continuing medical treatments  DNR with Trial of Intubation and continuing medical treatments   DNR/I  and comfort measures

## 2023-05-04 NOTE — PROGRESS NOTE ADULT - ASSESSMENT
52yo M w/ PMH of HIV, EtOH use disorder (last drink reportedly 3 yrs ago), OUD on suboxone (last use reportedly 1 yr ago) admitted with AMS in the setting of pancytopenia, sepsis, acute hepatitis with worsening respiratory failure      Acute Respiratory Failure  now on NRB  cont O2 support  family has been discussed AD- intubation    AMS  MF - metabolic, infectious  correcting underlying issues  patient without capacity to make decisions    Pancytopenia  Family states patient has clinic was calling patient since March to follow up on abnormal blood work  Holding off BM given acute respiratory issues    Opioid Use disorder  Recommend to touch base with prescribing provider Allie Humphrey?    Encounter for Palliative Care  see [facility] note above for details. In summary,   1.  Extensive family discussion explaining patient's worsening  condition - Respiratory failure, profound pancytopenia, liver failure  and how his baseline medical issues will have an affect on his ability to recover. Family reports patient did not follow up on abnormal labs since  March.   2.  Informed family patient in respiratory failure and may need intubation.  3.  Family informed of the limited benefit of CPR / intubation mech ventilation given his current condition in MOF  4. Family would like to discuss amongst themselves.  They were requested to name a point person with whom will give decisions.

## 2023-05-04 NOTE — BH CONSULTATION LIAISON PROGRESS NOTE - NSBHCHARTREVIEWVS_PSY_A_CORE FT
Vital Signs Last 24 Hrs  T(C): 36.3 (04 May 2023 12:39), Max: 38.8 (03 May 2023 23:20)  T(F): 97.4 (04 May 2023 12:39), Max: 101.8 (03 May 2023 23:20)  HR: 79 (04 May 2023 10:00) (79 - 125)  BP: 100/69 (04 May 2023 12:39) (96/69 - 121/73)  BP(mean): 82 (04 May 2023 06:00) (79 - 89)  RR: 40 (04 May 2023 12:39) (17 - 48)  SpO2: 94% (04 May 2023 12:39) (91% - 98%)    Parameters below as of 04 May 2023 12:39  Patient On (Oxygen Delivery Method): nasal cannula  O2 Flow (L/min): 12

## 2023-05-04 NOTE — CONSULT NOTE ADULT - ASSESSMENT
Patient is a 53 year-old Male admitted for sepsis and found to be pancytopenia. Seen in consultation for Bone marrow biopsy. Chart reviewed, given patient is being ruled out for pulmonary TB, will hold off on nonemergent bone marrow for now. Recommend obtaining three AFB cultures per SRINIVAS guidelines. If bone marrow is needed more emergently please re-consult IR.       Please call extension [phone] with any questions, concerns or issues regarding above.

## 2023-05-04 NOTE — BH CONSULTATION LIAISON PROGRESS NOTE - NSBHASSESSMENTFT_PSY_ALL_CORE
Patient is a 53 year old male who is unemployed, living in a rented room alone, with a history of depression as well as alcohol and opioid use disorder and a PMH of HIV who presented with weakness, AMS and jaundice. Psychiatry called to help with medication management     Patient seen and evaluated and found to have worsened mental status now only arousable to painful stimuli.        Dx. Delirium     Recs   - If patient becomes agitated requiring medication-Zyprexa IM 2.5 mg q6h  -Hold home Zyprexa and antidepressant for now   -Continue Suboxone   -Unlikely alcohol withdrawal based on history  -Maintain delirium precautions   -Avoid anticholinergic agents, benzos, opioid as they can further perpetuate confusion   -Frequent re orientation, Hydration, try to avoid restraints and if possible, mobilize patient and PT involvement  -will follow

## 2023-05-04 NOTE — PROVIDER CONTACT NOTE (CRITICAL VALUE NOTIFICATION) - TEST AND RESULT REPORTED:
WBC 0.28, Platelet 14
WBC-0.28  Platelet- 7
WBC and platelet
WBC- 0.18, Hemoglobin- 6.2, hematocrit 19.3, Platelets 14

## 2023-05-04 NOTE — PROGRESS NOTE ADULT - ASSESSMENT
52 y/o M with history of HIV, depression, substance and alcohol abuse. History is very limited, as family and patient are unable to provide much information. Per sister, patient lives alone in a shared apartment; he was last seen in his usual state of health about 2-3 weeks ago. It appears he was started on a new medication for depression, but his sister trashed all the prescription bottles in the garbage; she is unable to recall names or prescriber. Unknown HIV treatment, compliance or history of opportunistic infections (followed at [facility]?)    Differential diagnosis is broad including HLH, malignancy; if AIDS disseminated mycobacterial or fungal infection, viral coinfection      - Critically ill with worsening oxygen requirements  - BM Bx defer until TB is rule out? Isolation precautions should not delay procedures/diagnostic work up; however unable to proceed with biopsy due to patient's current condition   - HIV viral load undetectable     - Outpatient labs reviewed     - Well controlled HIV until at least  9/2022     - Last CD4 count in 3/2023 >> 150 but patient cytopenias were developing then       - T cell count unreliable in the setting of pancytopenia   - Increasing ferritin 58K  - NH3 normal   - Acute hepatitis panel neg   - CTH unremarkable would check MRI brain w/con  - Check AFB urine culture   - Check GI PCR   - Check Stool Cx, O&P, stool AFB  - Follow mycobacterial blood culture  - Follow Parvovirus B12 PCR  - Follow EBV PCR  - Follow CMV PCR  - Follow histoplasma AG   - Toxoplasma IgG positive   - Follow Fungitell and AGM  - Syphilis screen neg  - Crypto serum AG neg  - If able, induced AFB sputum x 3   - BM Bx being coordinated   - UDS + THC   - On empiric piperacillin-tazobactam for colitis + neutropenia   - On fluconazole (thrush + possible esophagitis?)  - No objections to steroids if needed from Hem/onc perspective   - Prognosis guarded  - Airborne precautions     D/w Dr. Hernandez and RUBENS Correa   d/w family at bedside  52 y/o M with history of HIV, depression, substance and alcohol abuse. History is very limited, as family and patient are unable to provide much information. Per sister, patient lives alone in a shared apartment; he was last seen in his usual state of health about 2-3 weeks ago. It appears he was started on a new medication for depression, but his sister trashed all the prescription bottles in the garbage; she is unable to recall names or prescriber. Unknown HIV treatment, compliance or history of opportunistic infections (followed at [facility]?)    Differential diagnosis is broad including HLH, malignancy; if AIDS disseminated mycobacterial or fungal infection, viral coinfection      - Critically ill with worsening oxygen requirements  - BM Bx defer until TB is rule out? Isolation precautions should not delay procedures/diagnostic work up; however unable to proceed with biopsy due to patient's current condition   - HIV viral load undetectable     - Outpatient labs reviewed     - Well controlled HIV until at least  9/2022     - Last CD4 count in 3/2023 >> 150 but cytopenias were developing then    - Increasing ferritin 58K  - NH3 normal   - Acute hepatitis panel neg   - CTH unremarkable would check MRI brain w/con  - Check AFB urine culture   - Check GI PCR   - Check Stool Cx, O&P, stool AFB  - Follow mycobacterial blood culture  - Follow Parvovirus B12 PCR  - Follow EBV PCR  - Follow CMV PCR  - Follow histoplasma AG   - Toxoplasma IgG positive   - Follow Fungitell and AGM  - Syphilis screen neg  - Crypto serum AG neg  - If able, induced AFB sputum x 3   - BM Bx being coordinated   - UDS + THC   - On empiric piperacillin-tazobactam for colitis + neutropenia   - On fluconazole (thrush + possible esophagitis?)  - No objections to steroids if needed from Hem/onc perspective   - Prognosis guarded  - Airborne precautions     D/w Dr. Hernandez and RUBENS Correa   d/w family at bedside

## 2023-05-04 NOTE — GOALS OF CARE CONVERSATION - ADVANCED CARE PLANNING - CONVERSATION DETAILS
Patient seen and examined.  Worsening hypoxia with PF ratio of 77 with respiratory rate ranging from 35-45 on BiPAP with FiO2 100% PEEP of 6.  Abdomen getting more taut with hypoactive bowel sounds.    Updated about worsening organ failure the patient's family members including 3 sisters at bedside Sil, Arleth and tricia  along with their kids and other nephews and nieces of the patient.  Patient is not  and does not have kids and has been parents have  and primary decision maker as far as medical visitation and follow-up is concerned has been Sil but they wanted to make the decision as a family about further goals of care pursuit.  Understanding worsening hypoxic respiratory failure with significant respiratory distress despite of being on noninvasive ventilator hence further goals of care pursued  with entire family eventually with the help of  Maico and everybody eventually and unanimous agreement that patient would not want artificial mechanical support and considering the probable grim diagnosis with limited option and existing organ failures, transition to comfort measures only.  Starting on Dilaudid infusion to be titrated for comfort  As needed 1 mg Ativan every 1 hour for anxiety and agitation  We will wean off of bilevel once patient is comfortable  No further labs imaging or bone marrow biopsy and discontinue other medications  Continue with indwelling urinary catheter for comfort  DNR/DNI  Emotional support provided and all questions answered over multiple conversations  with extensive family members at length to their satisfaction   plan discussed with medical ICU team

## 2023-05-04 NOTE — CONSULT NOTE ADULT - CONSULT REQUESTED DATE/TIME
02-May-2023 12:03
04-May-2023 12:00
03-May-2023 12:19
02-May-2023 11:44
03-May-2023 09:51
04-May-2023 08:17

## 2023-05-04 NOTE — PROGRESS NOTE ADULT - SUBJECTIVE AND OBJECTIVE BOX
[facility]  INFECTIOUS DISEASES at [place] [facility] [place]  ===============================================================                               Ruben Perez MD*     Ailyn Wallace MD*                         Selvin Glez MD*       Leticia Landon MD*            Diplomates American Board of Internal Medicine & Infectious Diseases                * [place] Office - Appt - Tel  [phone] Fax [phone]                * [place] Office - Appt - Tel [phone] Fax [phone]                                  Hospital Consult line:  [phone]  ==============================================================    YONATHAN FUNK [id number]    Follow up: AIDS, pancytopenia    Worsening clinical condition - hypoxemic requirement NRB. Obtunded  Intermittently febrile  Tachycardic but BP maintained    I have personally reviewed the labs and data; pertinent labs and data are listed in this note; please see below.     _______________________________________________________________  REVIEW OF SYSTEMS  Unable to obtain due to medical condition - altered mental status   ________________________________________________________________  Allergies:  No Known Allergies        ________________________________________________________________  PHYSICAL EXAM  GEN: critically ill, obtunded. Cachectic  HEENT: Icteric sclerae  NECK: Supple. Mid-line trachea  LUNGS: tachypenic, increased WOB, CTA, on NRM   HEART: RRR, tachy  ABDOMEN: soft, HSM, +BS  : Condom catheter  EXTREMITIES:  without  edema.  MSK: No joint swelling or deformity   NEUROLOGIC: obtunded, not following commands, flapping, tremors, moving all extrem   SKIN: marked jaundice, no rash or wounds   LINES: no central lines, only peripheral access c/d/i  ________________________________________________________________  Vitals:  T(F): 97.4 (04 May 2023 12:39), Max: 101.8 (03 May 2023 23:20)  HR: 79 (04 May 2023 10:00)  BP: 100/69 (04 May 2023 12:39)  RR: 40 (04 May 2023 12:39)  SpO2: 94% (04 May 2023 12:39) (91% - 98%)  temp max in last 48H T(F): , Max: 101.8 (05-03-23 @ 23:20)    Current Antibiotics:  fluconAZOLE IVPB      fluconAZOLE IVPB 200 milliGRAM(s) IV Intermittent every 24 hours  piperacillin/tazobactam IVPB.. 3.375 Gram(s) IV Intermittent every 8 hours  rifAXIMin 550 milliGRAM(s) Oral two times a day    Other medications:  buprenorphine 8 mG/naloxone 2 mG SL Film 1 Film(s) SubLingual three times a day  chlorhexidine 2% Cloths 1 Application(s) Topical <User Schedule>  lactulose Syrup 20 Gram(s) Oral three times a day  pantoprazole  Injectable 40 milliGRAM(s) IV Push every 12 hours  phytonadione   Solution 10 milliGRAM(s) Oral daily  thiamine IVPB 500 milliGRAM(s) IV Intermittent every 8 hours                            9.1    0.28  )-----------( 7        ( 04 May 2023 07:00 )             27.5     05-04    142  |  109<H>  |  55.4<H>  ----------------------------<  100<H>  3.6   |  18.0<L>  |  1.12    Ca    7.1<L>      04 May 2023 07:00  Phos  4.6     05-04  Mg     2.2     05-04    TPro  3.9<L>  /  Alb  2.2<L>  /  TBili  21.5<H>  /  DBili  >10.0<H>  /  AST  125<H>  /  ALT  68<H>  /  AlkPhos  451<H>  05-04    RECENT CULTURES:  05-03 @ 08:50 .Blood Blood     Culture - Acid Fast - Blood . (05.03.23 @ 08:50)    05-03 @ 06:50 .Blood Blood     Culture - Acid Fast - Blood . (05.03.23 @ 06:50)      05-01 @ 19:03 Clean Catch Clean Catch (Midstream)     <10,000 CFU/mL Normal Urogenital Zoë      05-01 @ 16:44      NotDetec      05-01 @ 14:55 .Blood Blood-Peripheral     No growth to date.      05-01 @ 14:40 .Blood Blood-Peripheral     No growth to date.      WBC Count: 0.28 K/uL (05-04-23 @ 07:00)  WBC Count: 0.28 K/uL (05-03-23 @ 17:01)  WBC Count: 0.18 K/uL (05-03-23 @ 06:50)  WBC Count: 0.30 K/uL (05-02-23 @ 02:58)  WBC Count: 0.94 K/uL (05-01-23 @ 14:55)    Creatinine, Serum: 1.12 mg/dL (05-04-23 @ 07:00)  Creatinine, Serum: 0.97 mg/dL (05-03-23 @ 21:50)  Creatinine, Serum: 1.08 mg/dL (05-03-23 @ 17:01)  Creatinine, Serum: 1.26 mg/dL (05-03-23 @ 06:50)  Creatinine, Serum: 1.01 mg/dL (05-02-23 @ 02:58)  Creatinine, Serum: 1.14 mg/dL (05-01-23 @ 14:55)    Procalcitonin, Serum: 11.99 ng/mL (05-04-23 @ 00:46)     SARS-CoV-2: NotDetec (05-01-23 @ 16:44)    ________________________________________________________________  RADIOLOGY  < from: Xray Chest 1 View- PORTABLE-Urgent (Xray Chest 1 View- PORTABLE-Urgent .) (05.04.23 @ 05:47) >  ACC: [id number] EXAM:  XR CHEST PORTABLE URGENT 1V   ORDERED BY: JERO MARTINEZ     PROCEDURE DATE:  05/04/2023          INTERPRETATION:  AP chest on May 4, 2023 at 5:30 AM. Patient is   desaturating.    Heart size normal.    There is a persistent infiltrate emanating off the right hilum similar to   May 1.    There may also be developing left base infiltrate at this time.    IMPRESSION: Persistent right lung infiltrate. Developing small left base   infiltrate.    < end of copied text >    < from: CT Abdomen and Pelvis w/ IV Cont (05.01.23 @ 17:45) >  FINDINGS:    CHEST:    LUNGS AND LARGE AIRWAYS:  PLEURA:  There are bilateral micronodular, tree-in-bud nodular opacities upper and   lower lung zones compatible with infectious/inflammatory small airway   disease.  There are mild dependent subsegmental atelectatic changes right lower   lobe.  No lobar lung consolidation, pleural effusion or pneumothorax.    The central airways are patent.    VESSELS: Within normal limits.    HEART: Heart size is normal. No pericardial effusion.    MEDIASTINUM AND NINFA:   No lymphadenopathy.  Diffuse esophageal wall thickening.    CHEST WALL AND LOWER NECK: Within normal limits.    ABDOMEN AND PELVIS:    LIVER: Within normal limits.  BILE DUCTS: Normal caliber.  GALLBLADDER: Edematous gallbladder wall.  SPLEEN:  Splenomegaly, 16 cm.  Approximately 2 cm wedge-shaped decreased attenuation peripherally lower   spleen extending to the capsule, suggestive of splenic infarct.  PANCREAS: Within normal limits.  ADRENALS: Within normal limits.  KIDNEYS/URETERS: Within normal limits.    BLADDER: Moderately distended.  REPRODUCTIVE ORGANS: Prostate not enlarged.    BOWEL:   PERITONEUM:  Evaluation of the stomach is limited without distention.    Bowel wall thickening with submucosal edema and mucosal hyperenhancement   right colon, hepatic flexure through cecum.  No bowel obstruction.  There is a moderate amount volume of abdominal and pelvic ascites.    VESSELS:  Subgastric and perisplenic collateral vessels.  RETROPERITONEUM/LYMPH NODES:  Prominent bilateral common iliac chain lymphadenopathy measuring up to   2.4 cm short axis on the right.  Bilateral external iliac chain lymphadenopathy, measuring up to 2 cm   short axis on the right.    ABDOMINAL WALL: Within normal limits.    BONES: Degenerative changes.    IMPRESSION:    Bilateral micronodular and tree-in-bud nodular opacities suggestive of   infectious/inflammatory small airway disease.    Portal hypertension, including splenomegaly and suggestion of splenic   infarct inferior pole.    Moderate volume of abdominal pelvic ascites.    Right-sided colitis, may be associated with portal colopathy versus   infectious or ischemic colitis.    Retroperitoneal/pelvic lymphadenopathy.    Other findings as discussed above.    < end of copied text >

## 2023-05-04 NOTE — BH CONSULTATION LIAISON PROGRESS NOTE - CURRENT MEDICATION
MEDICATIONS  (STANDING):  chlorhexidine 2% Cloths 1 Application(s) Topical <User Schedule>  fluconAZOLE IVPB      fluconAZOLE IVPB 200 milliGRAM(s) IV Intermittent every 24 hours  lactulose Syrup 20 Gram(s) Oral three times a day  pantoprazole  Injectable 40 milliGRAM(s) IV Push every 12 hours  phytonadione   Solution 10 milliGRAM(s) Oral daily  piperacillin/tazobactam IVPB.. 3.375 Gram(s) IV Intermittent every 8 hours  rifAXIMin 550 milliGRAM(s) Oral two times a day  thiamine IVPB 500 milliGRAM(s) IV Intermittent every 8 hours    MEDICATIONS  (PRN):

## 2023-05-04 NOTE — CONSULT NOTE ADULT - SUBJECTIVE AND OBJECTIVE BOX
Patient is a 53y old  Male who presents with a chief complaint of colitis/ gi bleed/ pancytopenia (04 May 2023 12:41)      BRIEF HOSPITAL COURSE: ***      Events last 24 hours: ***            PAST MEDICAL & SURGICAL HISTORY:  Rotator cuff tear, right      Knee pain, right      Lumbar pain  Pt states that he is having an AXEL on 7/30      Nose pain      Alcohol abuse      Heroin abuse      HIV disease      S/P shoulder surgery  right 2015          Review of Systems: Due to altered mental status, subjective information was not able to be obtained from the patient. History was obtained, to the extent possible, from review of the chart and collateral sources of information.        Medications:  fluconAZOLE IVPB      fluconAZOLE IVPB 200 milliGRAM(s) IV Intermittent every 24 hours  piperacillin/tazobactam IVPB.. 3.375 Gram(s) IV Intermittent every 8 hours  rifAXIMin 550 milliGRAM(s) Oral two times a day        buprenorphine 8 mG/naloxone 2 mG SL Film 1 Film(s) SubLingual three times a day  LORazepam   Injectable 1 milliGRAM(s) IV Push every 1 hour PRN        lactulose Syrup 20 Gram(s) Oral three times a day  pantoprazole  Injectable 40 milliGRAM(s) IV Push every 12 hours        phytonadione   Solution 10 milliGRAM(s) Oral daily  thiamine IVPB 500 milliGRAM(s) IV Intermittent every 8 hours      chlorhexidine 2% Cloths 1 Application(s) Topical <User Schedule>            ICU Vital Signs Last 24 Hrs  T(C): 36.3 (04 May 2023 12:39), Max: 38.8 (03 May 2023 23:20)  T(F): 97.4 (04 May 2023 12:39), Max: 101.8 (03 May 2023 23:20)  HR: 79 (04 May 2023 10:00) (79 - 125)  BP: 100/69 (04 May 2023 12:39) (96/69 - 121/73)  BP(mean): 82 (04 May 2023 06:00) (79 - 89)  ABP: --  ABP(mean): --  RR: 40 (04 May 2023 12:39) (17 - 48)  SpO2: 94% (04 May 2023 12:39) (91% - 98%)    O2 Parameters below as of 04 May 2023 12:39  Patient On (Oxygen Delivery Method): nasal cannula  O2 Flow (L/min): 12          ABG - ( 04 May 2023 06:09 )  pH, Arterial: 7.320 pH, Blood: x     /  pCO2: 35    /  pO2: 98    / HCO3: 18    / Base Excess: -8.1  /  SaO2: 98.6                I&O's Detail    03 May 2023 07:01  -  04 May 2023 07:00  --------------------------------------------------------  IN:    sodium chloride 0.9%: 750 mL  Total IN: 750 mL    OUT:    Voided (mL): 920 mL  Total OUT: 920 mL    Total NET: -170 mL            LABS:                        9.1    0.28  )-----------( 7        ( 04 May 2023 07:00 )             27.5     05-04    142  |  109<H>  |  55.4<H>  ----------------------------<  100<H>  3.6   |  18.0<L>  |  1.12    Ca    7.1<L>      04 May 2023 07:00  Phos  4.6     05-04  Mg     2.2     05-04    TPro  3.9<L>  /  Alb  2.2<L>  /  TBili  21.5<H>  /  DBili  >10.0<H>  /  AST  125<H>  /  ALT  68<H>  /  AlkPhos  451<H>  05-04          CAPILLARY BLOOD GLUCOSE        PT/INR - ( 04 May 2023 07:00 )   PT: 20.7 sec;   INR: 1.77 ratio         PTT - ( 04 May 2023 07:00 )  PTT:44.7 sec    CULTURES:  Culture Results:   <10,000 CFU/mL Normal Urogenital Zoë (05-01 @ 19:03)  Rapid RVP Result: NotDetec (05-01 @ 16:44)  Culture Results:   No growth to date. (05-01 @ 14:55)  Culture Results:   No growth to date. (05-01 @ 14:40)            Physical Examination:    General: Cachectic, ill appearing, in acute respiratory distress     HEENT: Pupils equal, reactive to light. Symmetric.    PULM: Tachypneic. Clear to auscultation bilaterally    CVS: Tachycardic     ABD: Softly distended     EXT: No edema, nontender    SKIN: Jaundiced    NEURO: Somnolent, confused         RADIOLOGY: < from: Xray Chest 1 View- PORTABLE-Urgent (Xray Chest 1 View- PORTABLE-Urgent .) (05.04.23 @ 05:47) >  IMPRESSION: Persistent right lung infiltrate. Developing small left base   infiltrate.    --- End of Report ---    RICHI ROSENTHAL MD; Attending Radiologist  This document has been electronically signed. May  4 2023 12:28PM        CRITICAL CARE TIME SPENT: 75 minutes spent performing frequent bedside reassessments and augmenting plan of care to address problems of acute critical illness that pose high probability of life threatening deterioration and/or end organ damage/dysfunction and discussing goals of care, non-inclusive of time spent on procedures performed. Patient is a 53y old  Male who presents with a chief complaint of colitis/ gi bleed/ pancytopenia (04 May 2023 12:41)      BRIEF HOSPITAL COURSE: 53 year old male with PMHx HIV, ETOH abuse (last drink 3 years ago), opiate abuse (last use 1 year ago, on Suboxone) presenting for evaluation of 2 weeks of worsening jaundice, decreased PO, weight loss, confusion. In the ER, found to be pancytopenic with occult blood positive in stool, Hb 6.7 , getting 2 units of prbc.       Events last 24 hours: ***            PAST MEDICAL & SURGICAL HISTORY:  Rotator cuff tear, right      Knee pain, right      Lumbar pain  Pt states that he is having an AXEL on 7/30      Nose pain      Alcohol abuse      Heroin abuse      HIV disease      S/P shoulder surgery  right 2015          Review of Systems: Due to altered mental status, subjective information was not able to be obtained from the patient. History was obtained, to the extent possible, from review of the chart and collateral sources of information.        Medications:  fluconAZOLE IVPB      fluconAZOLE IVPB 200 milliGRAM(s) IV Intermittent every 24 hours  piperacillin/tazobactam IVPB.. 3.375 Gram(s) IV Intermittent every 8 hours  rifAXIMin 550 milliGRAM(s) Oral two times a day        buprenorphine 8 mG/naloxone 2 mG SL Film 1 Film(s) SubLingual three times a day  LORazepam   Injectable 1 milliGRAM(s) IV Push every 1 hour PRN        lactulose Syrup 20 Gram(s) Oral three times a day  pantoprazole  Injectable 40 milliGRAM(s) IV Push every 12 hours        phytonadione   Solution 10 milliGRAM(s) Oral daily  thiamine IVPB 500 milliGRAM(s) IV Intermittent every 8 hours      chlorhexidine 2% Cloths 1 Application(s) Topical <User Schedule>            ICU Vital Signs Last 24 Hrs  T(C): 36.3 (04 May 2023 12:39), Max: 38.8 (03 May 2023 23:20)  T(F): 97.4 (04 May 2023 12:39), Max: 101.8 (03 May 2023 23:20)  HR: 79 (04 May 2023 10:00) (79 - 125)  BP: 100/69 (04 May 2023 12:39) (96/69 - 121/73)  BP(mean): 82 (04 May 2023 06:00) (79 - 89)  ABP: --  ABP(mean): --  RR: 40 (04 May 2023 12:39) (17 - 48)  SpO2: 94% (04 May 2023 12:39) (91% - 98%)    O2 Parameters below as of 04 May 2023 12:39  Patient On (Oxygen Delivery Method): nasal cannula  O2 Flow (L/min): 12          ABG - ( 04 May 2023 06:09 )  pH, Arterial: 7.320 pH, Blood: x     /  pCO2: 35    /  pO2: 98    / HCO3: 18    / Base Excess: -8.1  /  SaO2: 98.6                I&O's Detail    03 May 2023 07:01  -  04 May 2023 07:00  --------------------------------------------------------  IN:    sodium chloride 0.9%: 750 mL  Total IN: 750 mL    OUT:    Voided (mL): 920 mL  Total OUT: 920 mL    Total NET: -170 mL            LABS:                        9.1    0.28  )-----------( 7        ( 04 May 2023 07:00 )             27.5     05-04    142  |  109<H>  |  55.4<H>  ----------------------------<  100<H>  3.6   |  18.0<L>  |  1.12    Ca    7.1<L>      04 May 2023 07:00  Phos  4.6     05-04  Mg     2.2     05-04    TPro  3.9<L>  /  Alb  2.2<L>  /  TBili  21.5<H>  /  DBili  >10.0<H>  /  AST  125<H>  /  ALT  68<H>  /  AlkPhos  451<H>  05-04          CAPILLARY BLOOD GLUCOSE        PT/INR - ( 04 May 2023 07:00 )   PT: 20.7 sec;   INR: 1.77 ratio         PTT - ( 04 May 2023 07:00 )  PTT:44.7 sec    CULTURES:  Culture Results:   <10,000 CFU/mL Normal Urogenital Zoë (05-01 @ 19:03)  Rapid RVP Result: NotDetec (05-01 @ 16:44)  Culture Results:   No growth to date. (05-01 @ 14:55)  Culture Results:   No growth to date. (05-01 @ 14:40)            Physical Examination:    General: Cachectic, ill appearing, in acute respiratory distress     HEENT: Pupils equal, reactive to light. Symmetric.    PULM: Tachypneic. Clear to auscultation bilaterally    CVS: Tachycardic     ABD: Softly distended     EXT: No edema, nontender    SKIN: Jaundiced    NEURO: Somnolent, confused         RADIOLOGY: < from: Xray Chest 1 View- PORTABLE-Urgent (Xray Chest 1 View- PORTABLE-Urgent .) (05.04.23 @ 05:47) >  IMPRESSION: Persistent right lung infiltrate. Developing small left base   infiltrate.    --- End of Report ---    RICHI ROSENTHAL MD; Attending Radiologist  This document has been electronically signed. May  4 2023 12:28PM        CRITICAL CARE TIME SPENT: 75 minutes spent performing frequent bedside reassessments and augmenting plan of care to address problems of acute critical illness that pose high probability of life threatening deterioration and/or end organ damage/dysfunction and discussing goals of care, non-inclusive of time spent on procedures performed. Patient is a 53y old  Male who presents with a chief complaint of colitis/ gi bleed/ pancytopenia (04 May 2023 12:41)      BRIEF HOSPITAL COURSE: 53 year old male with PMHx HIV, ETOH abuse (last drink 3 years ago), opiate abuse (last use 1 year ago, on Suboxone) presenting for evaluation of 2 weeks of worsening jaundice, decreased PO intake, weight loss, confusion. In the ER, found to be pancytopenic with occult blood positive in stool, transfused 2 units of PRBC. Patient was noted to be in acute liver failure with significantly elevated bili, INR, ferritin, and LDH levels. Ammonia within normal limits. Initial blood and urine cultures negative. Rapid HIV positive but viral load undetectable. CT chest/abd/pelv revealed bilateral tree-in-bud opacities c/w infectious vs inflammatory small airway disease, portal hypertension with moderate ascites, splenomegaly with splenic infarct, right sided colitis (portal colopathy vs infectious vs ischemic colitis), and retroperitoneal and pelvic lymphadenopathy. He was admitted to medicine service for neutropenic fever / severe sepsis workup with Heme/Onc consult.      Events last 24 hours: MICU consulted for acute hypoxic respiratory failure. On evaluation, patient encephalopathic, tachypneic with RR 40's on 100% NRB.             PAST MEDICAL & SURGICAL HISTORY:  Rotator cuff tear, right      Knee pain, right      Lumbar pain  Pt states that he is having an AXEL on 7/30      Nose pain      Alcohol abuse      Heroin abuse      HIV disease      S/P shoulder surgery  right 2015          Review of Systems: Due to altered mental status, subjective information was not able to be obtained from the patient. History was obtained, to the extent possible, from review of the chart and collateral sources of information.        Medications:  fluconAZOLE IVPB      fluconAZOLE IVPB 200 milliGRAM(s) IV Intermittent every 24 hours  piperacillin/tazobactam IVPB.. 3.375 Gram(s) IV Intermittent every 8 hours  rifAXIMin 550 milliGRAM(s) Oral two times a day        buprenorphine 8 mG/naloxone 2 mG SL Film 1 Film(s) SubLingual three times a day  LORazepam   Injectable 1 milliGRAM(s) IV Push every 1 hour PRN        lactulose Syrup 20 Gram(s) Oral three times a day  pantoprazole  Injectable 40 milliGRAM(s) IV Push every 12 hours        phytonadione   Solution 10 milliGRAM(s) Oral daily  thiamine IVPB 500 milliGRAM(s) IV Intermittent every 8 hours      chlorhexidine 2% Cloths 1 Application(s) Topical <User Schedule>            ICU Vital Signs Last 24 Hrs  T(C): 36.3 (04 May 2023 12:39), Max: 38.8 (03 May 2023 23:20)  T(F): 97.4 (04 May 2023 12:39), Max: 101.8 (03 May 2023 23:20)  HR: 79 (04 May 2023 10:00) (79 - 125)  BP: 100/69 (04 May 2023 12:39) (96/69 - 121/73)  BP(mean): 82 (04 May 2023 06:00) (79 - 89)  ABP: --  ABP(mean): --  RR: 40 (04 May 2023 12:39) (17 - 48)  SpO2: 94% (04 May 2023 12:39) (91% - 98%)    O2 Parameters below as of 04 May 2023 12:39  Patient On (Oxygen Delivery Method): nasal cannula  O2 Flow (L/min): 12          ABG - ( 04 May 2023 06:09 )  pH, Arterial: 7.320 pH, Blood: x     /  pCO2: 35    /  pO2: 98    / HCO3: 18    / Base Excess: -8.1  /  SaO2: 98.6                I&O's Detail    03 May 2023 07:01  -  04 May 2023 07:00  --------------------------------------------------------  IN:    sodium chloride 0.9%: 750 mL  Total IN: 750 mL    OUT:    Voided (mL): 920 mL  Total OUT: 920 mL    Total NET: -170 mL            LABS:                        9.1    0.28  )-----------( 7        ( 04 May 2023 07:00 )             27.5     05-04    142  |  109<H>  |  55.4<H>  ----------------------------<  100<H>  3.6   |  18.0<L>  |  1.12    Ca    7.1<L>      04 May 2023 07:00  Phos  4.6     05-04  Mg     2.2     05-04    TPro  3.9<L>  /  Alb  2.2<L>  /  TBili  21.5<H>  /  DBili  >10.0<H>  /  AST  125<H>  /  ALT  68<H>  /  AlkPhos  451<H>  05-04          CAPILLARY BLOOD GLUCOSE        PT/INR - ( 04 May 2023 07:00 )   PT: 20.7 sec;   INR: 1.77 ratio         PTT - ( 04 May 2023 07:00 )  PTT:44.7 sec    CULTURES:  Culture Results:   <10,000 CFU/mL Normal Urogenital Zoë (05-01 @ 19:03)  Rapid RVP Result: NotDetec (05-01 @ 16:44)  Culture Results:   No growth to date. (05-01 @ 14:55)  Culture Results:   No growth to date. (05-01 @ 14:40)        < from: CT Abdomen and Pelvis w/ IV Cont (05.01.23 @ 17:45) >    IMPRESSION:    Bilateral micronodular and tree-in-bud nodular opacities suggestive of   infectious/inflammatory small airway disease.    Portal hypertension, including splenomegaly and suggestion of splenic   infarct inferior pole.    Moderate volume of abdominal pelvic ascites.    Right-sided colitis, may be associated with portal colopathy versus   infectious or ischemic colitis.    Retroperitoneal/pelvic lymphadenopathy.    Other findings as discussed above.    --- End of Report ---            FRANKO HOLDEN MD; Attending Radiologist  This document has been electronically signed. May  1 2023  6:19PM    < end of copied text >      Physical Examination:    General: Cachectic, ill appearing, in acute respiratory distress     HEENT: Pupils equal, reactive to light. Symmetric.    PULM: Tachypneic. Clear to auscultation bilaterally    CVS: Tachycardic     ABD: Softly distended     EXT: No edema, nontender    SKIN: Jaundiced    NEURO: Somnolent, confused         RADIOLOGY: < from: Xray Chest 1 View- PORTABLE-Urgent (Xray Chest 1 View- PORTABLE-Urgent .) (05.04.23 @ 05:47) >  IMPRESSION: Persistent right lung infiltrate. Developing small left base   infiltrate.    --- End of Report ---    RICHI ROSENTHAL MD; Attending Radiologist  This document has been electronically signed. May  4 2023 12:28PM              CRITICAL CARE TIME SPENT: 120 minutes spent performing frequent bedside reassessments and augmenting plan of care to address problems of acute critical illness that pose high probability of life threatening deterioration and/or end organ damage/dysfunction and discussing goals of care, non-inclusive of time spent on procedures performed.

## 2023-05-04 NOTE — PROGRESS NOTE ADULT - SUBJECTIVE AND OBJECTIVE BOX
INTERVAL EVENTS:  The patient remains altered  He is now on a non-rebreathable mask and planned to be transferred to the ICU  continues to spike fevers to 101.8       REVIEW OF SYSTEMS:  unable to assess    Vital Signs Last 24 Hrs  T(C): 35 (04 May 2023 16:15), Max: 38.8 (03 May 2023 23:20)  T(F): 95 (04 May 2023 16:15), Max: 101.8 (03 May 2023 23:20)  HR: 88 (04 May 2023 16:30) (78 - 125)  BP: 98/67 (04 May 2023 16:30) (96/69 - 121/73)  BP(mean): 77 (04 May 2023 16:30) (75 - 89)  RR: 43 (04 May 2023 16:30) (17 - 48)  SpO2: 94% (04 May 2023 16:30) (91% - 99%)    Parameters below as of 04 May 2023 16:00  Patient On (Oxygen Delivery Method): BiPAP/CPAP    PHYSICAL EXAM:    GENERAL: NAD. Patient is toxic appearing, and jaundiced. Patient is thin/   HEAD:  Atraumatic, Normocephalic  EYES: + scleral icterus.   NECK: Supple, No JVD, Normal thyroid  NERVOUS SYSTEM:  Alert & Oriented X0,Motor Strength 5/5 B/L upper and lower extremities; DTRs 2+ intact and symmetric  CHEST/LUNG: on NRB  HEART: Regular rate and rhythm; No murmurs, rubs, or gallops  ABDOMEN: Soft, Nontender, Nondistended; Bowel sounds present  EXTREMITIES:  2+ Peripheral Pulses, No clubbing, cyanosis, or edema  LYMPH: No lymphadenopathy noted  SKIN: No rashes or lesions                              9.1    0.28  )-----------( 7        ( 04 May 2023 07:00 )             27.5     05-04    142  |  109<H>  |  55.4<H>  ----------------------------<  100<H>  3.6   |  18.0<L>  |  1.12    Ca    7.1<L>      04 May 2023 07:00  Phos  4.6     05-04  Mg     2.2     05-04    TPro  3.9<L>  /  Alb  2.2<L>  /  TBili  21.5<H>  /  DBili  >10.0<H>  /  AST  125<H>  /  ALT  68<H>  /  AlkPhos  451<H>  05-04    PT/INR - ( 04 May 2023 07:00 )   PT: 20.7 sec;   INR: 1.77 ratio         PTT - ( 04 May 2023 07:00 )  PTT:44.7 sec    MEDICATIONS  (STANDING):  chlorhexidine 2% Cloths 1 Application(s) Topical <User Schedule>  dexAMETHasone  IVPB 20 milliGRAM(s) IV Intermittent daily  fluconAZOLE IVPB      fluconAZOLE IVPB 200 milliGRAM(s) IV Intermittent every 24 hours  lactulose Syrup 20 Gram(s) Oral three times a day  pantoprazole  Injectable 40 milliGRAM(s) IV Push every 12 hours  phytonadione   Solution 10 milliGRAM(s) Oral daily  piperacillin/tazobactam IVPB.. 3.375 Gram(s) IV Intermittent every 8 hours  rifAXIMin 550 milliGRAM(s) Oral two times a day  thiamine IVPB 500 milliGRAM(s) IV Intermittent every 8 hours

## 2023-05-04 NOTE — CONSULT NOTE ADULT - SUBJECTIVE AND OBJECTIVE BOX
HPI:  53 year-old male with a history of HIV, ETOH abuse, opiate abuse presented to [facility] ED for worsening jaundice, decreased PO, weight loss, confusion. In the ED, noted to be pancytopenic, anemic (hgb6.7) and recieved 2 units PRBC.  CT chest noted for Bilateral micronodular and tree-in-bud nodular opacities suggestive of infectious/inflammatory small airway disease. Hemonc following and recommending IR bone marrow biopsy.     ============================================================================  Medications:  Home Medications:  Daily Multi oral tablet: 1 tab(s) orally once a day (22 Jun 2018 11:22)  escitalopram 10 mg oral tablet: 1 tab(s) orally once a day (22 Jun 2018 11:22)  Prezcobix 800 mg-150 mg oral tablet: 1 tab(s) orally once a day (22 Jun 2018 11:22)  Suboxone 8 mg-2 mg sublingual tablet: sublingual 3 times a day (22 Jun 2018 11:22)  Tivicay 50 mg oral tablet: 1 tab(s) orally once a day (22 Jun 2018 11:22)  Truvada 200 mg-300 mg oral tablet: 1 tab(s) orally once a day (22 Jun 2018 11:22)    MEDICATIONS  (STANDING):  buprenorphine 8 mG/naloxone 2 mG SL Film 1 Film(s) SubLingual three times a day  fluconAZOLE IVPB      fluconAZOLE IVPB 200 milliGRAM(s) IV Intermittent every 24 hours  lactulose Syrup 20 Gram(s) Oral three times a day  pantoprazole  Injectable 40 milliGRAM(s) IV Push every 12 hours  phytonadione   Solution 10 milliGRAM(s) Oral daily  piperacillin/tazobactam IVPB.. 3.375 Gram(s) IV Intermittent every 8 hours  rifAXIMin 550 milliGRAM(s) Oral two times a day  thiamine IVPB 500 milliGRAM(s) IV Intermittent every 8 hours    MEDICATIONS  (PRN):  LORazepam   Injectable 1 milliGRAM(s) IV Push every 1 hour PRN CIWA-Ar score 8 or greater      Allergies:   No Known Allergies    ============================================================================  PAST MEDICAL & SURGICAL HISTORY:  Rotator cuff tear, right      Knee pain, right      Lumbar pain  Pt states that he is having an AXEL on 7/30      Nose pain      Alcohol abuse      Heroin abuse      HIV disease      S/P shoulder surgery  right 2015          FAMILY HISTORY:  Family history of hypertension (Mother)        Social History:      ============================================================================  Vitals:  Vital Signs Last 24 Hrs  T(C): 36.4 (04 May 2023 08:00), Max: 38.8 (03 May 2023 23:20)  T(F): 97.5 (04 May 2023 08:00), Max: 101.8 (03 May 2023 23:20)  HR: 95 (04 May 2023 06:00) (84 - 125)  BP: 107/70 (04 May 2023 06:00) (102/61 - 121/73)  BP(mean): 82 (04 May 2023 06:00) (79 - 89)  RR: 17 (04 May 2023 04:01) (17 - 20)  SpO2: 92% (04 May 2023 06:00) (92% - 98%)    Parameters below as of 04 May 2023 06:00  Patient On (Oxygen Delivery Method): mask, Venturi    O2 Concentration (%): 40    Labs:                        9.1    0.28  )-----------( 7        ( 04 May 2023 07:00 )             27.5     05-04    142  |  109<H>  |  55.4<H>  ----------------------------<  100<H>  3.6   |  18.0<L>  |  1.12    Ca    7.1<L>      04 May 2023 07:00  Phos  4.6     05-04  Mg     2.2     05-04    TPro  3.9<L>  /  Alb  2.2<L>  /  TBili  21.5<H>  /  DBili  >10.0<H>  /  AST  125<H>  /  ALT  68<H>  /  AlkPhos  451<H>  05-04    PT/INR - ( 04 May 2023 07:00 )   PT: 20.7 sec;   INR: 1.77 ratio         PTT - ( 04 May 2023 07:00 )  PTT:44.7 sec    Imaging:   Pertinent Imaging Reviewed.    ============================================================================

## 2023-05-04 NOTE — CONSULT NOTE ADULT - ASSESSMENT
53 year old male with PMHx HIV, ETOH abuse (last drink 3 years ago), opiate abuse (last use 1 year ago, on Suboxone) presenting for evaluation of 2 weeks of worsening jaundice, decreased PO intake, weight loss, confusion. Admitted with medicine service with pancytopenia, neutropenic fever, ruling out severe sepsis present on admission of uncertain etiology, complicated by acute toxic-metabolic encephalopathy, acute liver failure with significantly elevated bili, INR, ferritin, and LDH levels, suspected GI bleed with component of acute blood loss anemia.     Transfer to MICU for acute hypoxic respiratory failure  Encephalopathy etiology uncertain, related to systemic infection ? HIV encephalitis, HLH. Hold Suboxone for now due to the risk for accumulation in the setting of acute liver failure.  High risk for necessitating urgent intubation. Goals of care discussed extensively with patient's family, they do not want him intubated due to concerns of patient's comfort, however are unclear as they would want all life sustaining and saving measures, including CPR, to be performed. This was conveyed to Palliative Care team, who will assist us in ongoing goals of care conversations with patient's multiple family members.   Start BiPAP in interim to support work of breathing   Thus far infectious workup has been unyielding, on empiric antibiotics and antifungals with Zosyn and Diflucan. Sputum induction sent this morning for AFB to r/o JAMAR.   Overall high clinical suspicion for HLH, meets multiple criteria (cytopenias, high ferritin, LDH, liver enzymes, coagulation profile, altered mental status) especially with underlying immunocompromised status, possibly infectious trigger and concern for occult malignancy. CD25 and NK activity sent and is pending.  Discussed extensively with Heme/Onc, was planned for urgent IR guided bone marrow biopsy today, which was deferred due to rule out TB status. However, upon review of imaging there is very low suspicion for and probability of disseminated TB. AFB testing performed thus far from serum has been negative.  Given his abrupt decompensation and worsening multiorgan failure, IR reconsulted for emergent BM biopsy. Spoke with Dr. Wolff, patient is not optimized for procedure at this time due to unstable respiratory status and high risk for decompensation while prone. Unfortunately, patient's family not amenable to intubation at this time. Coordinated with Heme/Onc, will tentatively plan for bedside BM bx by Heme/Onc team tomorrow or sooner by IR if patient's respiratory status can be stabilized.  Per Heme/Onc, will start empiric treatment for HLH and possible malignancy with Dexamethasone 20mg IVPB daily in the interim. Treatment with Etoposide precluded by acute liver failure.

## 2023-05-04 NOTE — CONSULT NOTE ADULT - REASON FOR ADMISSION
colitis/ gi bleed/ pancytopenia

## 2023-05-04 NOTE — PROGRESS NOTE ADULT - SUBJECTIVE AND OBJECTIVE BOX
HOSPITALIST PROGRESS NOTE    YONATHAN FUNK  [id number]  53yMale    Patient is a 53y old  Male who presents with a chief complaint of colitis/ gi bleed/ pancytopenia (04 May 2023 12:41)      SUBJECTIVE:   Chart reviewed since admission.     IR deferred bone marrow biopsy    Patient seen and examined at bedside for pancytopenia, Encephalopathy, Coagulapathy with jaundice, sepsis, pneumonia, colitis, ascites    Obtunded. Family at bedside  Doesn't not respond to verbal stimuli.  Tries to push examiner hand away.    Hasn't been following up; clinic has been calling since March as per Nephew      OBJECTIVE:  Vital Signs Last 24 Hrs  T(C): 36.3 (04 May 2023 12:39), Max: 38.8 (03 May 2023 23:20)  T(F): 97.4 (04 May 2023 12:39), Max: 101.8 (03 May 2023 23:20)  HR: 79 (04 May 2023 10:00) (79 - 125)  BP: 100/69 (04 May 2023 12:39) (96/69 - 121/73)  BP(mean): 82 (04 May 2023 06:00) (79 - 89)  RR: 40 (04 May 2023 12:39) (17 - 48)  SpO2: 94% (04 May 2023 12:39) (91% - 98%)    Parameters below as of 04 May 2023 12:39  Patient On (Oxygen Delivery Method): nasal cannula  O2 Flow (L/min): 12      PHYSICAL EXAMINATION  General: Grossly jaundice male lying on bed, obtunded  HEENT:  icteric sclera, dry oral mucosa  NECK: Supple  CVS: regular rate and rhythm S1 S2  RESP:  CTAB  GI:  Soft nontender BS+ Nonbloody liquid stool  : Condom catheter  MSK:  moves extremities spontaneously, tries to push examiner hand  CNS:  Does not follow commands, trying to get up when having bowel movement. Left arm with tremors  INTEG:  jaundice  PSYCH:  obtunded    MONITOR:  CAPILLARY BLOOD GLUCOSE            I&O's Summary    03 May 2023 07:01  -  04 May 2023 07:00  --------------------------------------------------------  IN: 750 mL / OUT: 920 mL / NET: -170 mL                            9.1    0.28  )-----------( 7        ( 04 May 2023 07:00 )             27.5     PT/INR - ( 04 May 2023 07:00 )   PT: 20.7 sec;   INR: 1.77 ratio         PTT - ( 04 May 2023 07:00 )  PTT:44.7 sec  05-04    142  |  109<H>  |  55.4<H>  ----------------------------<  100<H>  3.6   |  18.0<L>  |  1.12    Ca    7.1<L>      04 May 2023 07:00  Phos  4.6     05-04  Mg     2.2     05-04    TPro  3.9<L>  /  Alb  2.2<L>  /  TBili  21.5<H>  /  DBili  >10.0<H>  /  AST  125<H>  /  ALT  68<H>  /  AlkPhos  451<H>  05-04            Culture:    TTE:    RADIOLOGY  < from: Xray Chest 1 View- PORTABLE-Urgent (05.01.23 @ 17:01) >    IMPRESSION:   No radiographic evidence of active chest disease.  Please see same day chest CT report.    --- End of Report ---    < end of copied text >    < from: CT Head No Cont (05.01.23 @ 17:42) >    IMPRESSION:  No evidence of acute transcortical infarct, acute intracranial   hemorrhage, or mass effect.    --- End of Report ---    < end of copied text >      < from: US Abdomen Upper Quadrant Right (05.02.23 @ 09:01) >    IMPRESSION:  Hepatomegaly with  possible early cirrhotic change.  No biliary dilatation.  Mild ascites.      < end of copied text >    < from: Xray Chest 1 View- PORTABLE-Urgent (Xray Chest 1 View- PORTABLE-Urgent .) (05.04.23 @ 05:47) >    IMPRESSION: Persistent right lung infiltrate. Developing small left base   infiltrate.    --- End of Report ---    < end of copied text >      MEDICATIONS  (STANDING):  buprenorphine 8 mG/naloxone 2 mG SL Film 1 Film(s) SubLingual three times a day  chlorhexidine 2% Cloths 1 Application(s) Topical <User Schedule>  fluconAZOLE IVPB      fluconAZOLE IVPB 200 milliGRAM(s) IV Intermittent every 24 hours  lactulose Syrup 20 Gram(s) Oral three times a day  pantoprazole  Injectable 40 milliGRAM(s) IV Push every 12 hours  phytonadione   Solution 10 milliGRAM(s) Oral daily  piperacillin/tazobactam IVPB.. 3.375 Gram(s) IV Intermittent every 8 hours  rifAXIMin 550 milliGRAM(s) Oral two times a day  thiamine IVPB 500 milliGRAM(s) IV Intermittent every 8 hours      MEDICATIONS  (PRN):  LORazepam   Injectable 1 milliGRAM(s) IV Push every 1 hour PRN CIWA-Ar score 8 or greater     HOSPITALIST PROGRESS NOTE    YONATHAN FUNK  [id number]  53yMale    Patient is a 53y old  Male who presents with a chief complaint of colitis/ gi bleed/ pancytopenia (04 May 2023 12:41)      SUBJECTIVE:   Chart reviewed since admission.     IR deferred bone marrow biopsy    Patient seen and examined at bedside for pancytopenia, Encephalopathy, Coagulapathy with jaundice, sepsis, pneumonia, colitis, ascites    Obtunded. Family at bedside  Doesn't not respond to verbal stimuli.  Tries to push examiner hand away.    Hasn't been following up; clinic has been calling since March as per Nephew      OBJECTIVE:  Vital Signs Last 24 Hrs  T(C): 36.3 (04 May 2023 12:39), Max: 38.8 (03 May 2023 23:20)  T(F): 97.4 (04 May 2023 12:39), Max: 101.8 (03 May 2023 23:20)  HR: 79 (04 May 2023 10:00) (79 - 125)  BP: 100/69 (04 May 2023 12:39) (96/69 - 121/73)  BP(mean): 82 (04 May 2023 06:00) (79 - 89)  RR: 40 (04 May 2023 12:39) (17 - 48)  SpO2: 94% (04 May 2023 12:39) (91% - 98%)    Parameters below as of 04 May 2023 12:39  Patient On (Oxygen Delivery Method): nasal cannula  O2 Flow (L/min): 12      PHYSICAL EXAMINATION  General: Grossly jaundice male lying on bed, obtunded  HEENT:  icteric sclera, dry oral mucosa  NECK: Supple  CVS: regular rate and rhythm S1 S2  RESP:  CTAB  GI:  Soft nontender BS+ Nonbloody liquid stool  : Condom catheter  MSK:  moves extremities spontaneously, tries to push examiner hand  CNS:  Does not follow commands, trying to get up when having bowel movement. Left arm with tremors  INTEG:  jaundice  PSYCH:  obtunded    MONITOR:  CAPILLARY BLOOD GLUCOSE            I&O's Summary    03 May 2023 07:01  -  04 May 2023 07:00  --------------------------------------------------------  IN: 750 mL / OUT: 920 mL / NET: -170 mL                            9.1    0.28  )-----------( 7        ( 04 May 2023 07:00 )             27.5     PT/INR - ( 04 May 2023 07:00 )   PT: 20.7 sec;   INR: 1.77 ratio         PTT - ( 04 May 2023 07:00 )  PTT:44.7 sec  05-04    142  |  109<H>  |  55.4<H>  ----------------------------<  100<H>  3.6   |  18.0<L>  |  1.12    Ca    7.1<L>      04 May 2023 07:00  Phos  4.6     05-04  Mg     2.2     05-04    TPro  3.9<L>  /  Alb  2.2<L>  /  TBili  21.5<H>  /  DBili  >10.0<H>  /  AST  125<H>  /  ALT  68<H>  /  AlkPhos  451<H>  05-04            Culture:    TTE:    RADIOLOGY  < from: Xray Chest 1 View- PORTABLE-Urgent (05.01.23 @ 17:01) >    IMPRESSION:   No radiographic evidence of active chest disease.  Please see same day chest CT report.    --- End of Report ---    < end of copied text >    < from: CT Head No Cont (05.01.23 @ 17:42) >    IMPRESSION:  No evidence of acute transcortical infarct, acute intracranial   hemorrhage, or mass effect.    --- End of Report ---    < end of copied text >      < from: US Abdomen Upper Quadrant Right (05.02.23 @ 09:01) >    IMPRESSION:  Hepatomegaly with  possible early cirrhotic change.  No biliary dilatation.  Mild ascites.      < end of copied text >    < from: Xray Chest 1 View- PORTABLE-Urgent (Xray Chest 1 View- PORTABLE-Urgent .) (05.04.23 @ 05:47) >    IMPRESSION: Persistent right lung infiltrate. Developing small left base   infiltrate.    --- End of Report ---    < end of copied text >    < from: CT Chest w/ IV Cont (05.01.23 @ 17:45) >    IMPRESSION:    Bilateral micronodular and tree-in-bud nodular opacities suggestive of   infectious/inflammatory small airway disease.    Portal hypertension, including splenomegaly and suggestion of splenic   infarct inferior pole.    Moderate volume of abdominal pelvic ascites.    Right-sided colitis, may be associated with portal colopathy versus   infectious or ischemic colitis.    Retroperitoneal/pelvic lymphadenopathy.    Other findings as discussed above.    --- End of Report ---      < end of copied text >      MEDICATIONS  (STANDING):  buprenorphine 8 mG/naloxone 2 mG SL Film 1 Film(s) SubLingual three times a day  chlorhexidine 2% Cloths 1 Application(s) Topical <User Schedule>  fluconAZOLE IVPB      fluconAZOLE IVPB 200 milliGRAM(s) IV Intermittent every 24 hours  lactulose Syrup 20 Gram(s) Oral three times a day  pantoprazole  Injectable 40 milliGRAM(s) IV Push every 12 hours  phytonadione   Solution 10 milliGRAM(s) Oral daily  piperacillin/tazobactam IVPB.. 3.375 Gram(s) IV Intermittent every 8 hours  rifAXIMin 550 milliGRAM(s) Oral two times a day  thiamine IVPB 500 milliGRAM(s) IV Intermittent every 8 hours      MEDICATIONS  (PRN):  LORazepam   Injectable 1 milliGRAM(s) IV Push every 1 hour PRN CIWA-Ar score 8 or greater

## 2023-05-04 NOTE — BH CONSULTATION LIAISON PROGRESS NOTE - NSBHFUPINTERVALHXFT_PSY_A_CORE
Chart reviewed. Worsening clinical status now with hypoxemic requirement NRB, intermittently febrile. PRNs: Valium 10 mg x1 (1710)    Per PA and RN patient has been worsening clinically.    Patient is found sleeping in bed with NRB mask.  Patient not arousable to voice or light touch.   Multiple family members at bedside.

## 2023-05-04 NOTE — PROGRESS NOTE ADULT - ASSESSMENT
53 year old male with with PMH HIV, Opioid abuse on Suboxone, Alcohol abuse presented with worsening jaundice, anorexia and confusion        53 year old male with PMHx , hiv , ETOH abuse (last drink 3 years ago), opiate abuse (last use 1 year ago, has been on suboxone) presenting for evaluation of 2 weeks of worsening jaundice, decreased PO, weight loss, confusion. In the ER, found to be pancytopenic with occult blood positive in stool, Hb 6.7 , getting 2 units of prbc.       Pancytopenia - Unclear etiology at this time  Obtained collateral information from [facility]. Patients cell lines were normal in 9/2022 then began decreasing in March. patient did not follow up after that despite efforts by clinic.   COncern for underlying bone marrow process +/- infectious process HLH?   Heme onc consulted  IR consulted for biopsy   s/p 3U PRBC      Sepsis Present on admission  HIV - Stopped taking meds month ago.  Febrile neutropenia/ colitis. Possible AIDS, MAC, TB?  Switched to Zosyn for pseudomonal coverage   f/u cultures  ID consulted  AFB induction  Follow up infectious work up sent    Acute Hepatitis with elevated transaminases and elevated bilirubin  coagulopathy:   Cont vitamin K  Viral panel neg  trend liver function  GI following    HIV: based on Dr Rey :   prezcovix 800-150mg daily, tivicay 50mg daily, descovy 200-25mg daily    Anxiety/depression: bipolar?/ schizophrenia?  per roberto carlos:   gabapenin 300mg BID and 600 qhs  trazodone 50mg qhs  olanzapine 10mg qhs  sertraline 50mg qd  [id number] qd  psych eval, hold psych meds for now    History of opioid abuse, alcohol abuse  follows at [facility] has been prescribed suboxone for many years. Was filling prescriptions regularly.  Stopped going to clinic in march.   Per Sam Cooley patient was found with several completely full boxes of suboxone, the most recent being from March 2023.   Raising suspicion for non compliance and drug use but urine drug screen did not have opiates on admission.  8/2 TID    Tachycardia   EKG with sinus tach, no ischemia   Minimal improvement with saline bolus   Added additional saline bolus at 1700.  Consideration for alcohol withdrawal? Patient is within 72 hour window of admission. And has tremors on exam. Discussed with Sam Zaldivar who endorses that he has been cleaning the patient's room for last 3 weeks because he has not been able to walk around and has not seen any alcohol.   Possibly secretly ingesting?  Will trial valium 10mg ivp and observe for potential response  cont tele  upgrade to stepdown  if no improvement after valium and 2nd liter will consult ICU    Hypokalemia  Replete IV     Morning cbc, bmp reviewed. Morning CBC, BMP ordered  DVT ppx:  Dispo: Acute. Upgrade to step-down.   Case Discussed with Psychiatry, ID, Hematology     Total time spent reviewing chart, images, labs as well as peforming history, physical exam, obtaining collateral history from nephew, [place] [facility] discussing with patient and family as well as psychiatry, ID , hematology attending, as well as discussing case with [facility] Hematology - 180 Minutes             53 year old male with with PMH HIV, Opioid abuse on Suboxone, Alcohol abuse presented with worsening jaundice, anorexia and confusion x 2 weeks.  Tachycardiac and Hypoxic in ER with Pancytopenia, Hyperbilirubinemia, Coagulopathy. Lactic Acidosis  CT Head (-). CT chest with bilateral tree in bed nodular opacities. CT Abdomen with poratl HTN, Splenomegaly, Ascite, ISAEL and right sided colitis.  Admitted and placed on Ertapenem for Colitis and transfused PRBC. Evaluated by ID; concern for AIDS associated disease, (HH, Advanced diseases, malignancy, etc) and work up ordered.  Evaluated by Hematology, BM recommended for HLH.  Interval worsening with fever.      Pancytopeni    Pancytopenia - Unclear etiology at this time  Obtained collateral information from [facility]. Patients cell lines were normal in 9/2022 then began decreasing in March. patient did not follow up after that despite efforts by clinic.   COncern for underlying bone marrow process +/- infectious process HLH?   Heme onc consulted  IR consulted for biopsy   s/p 3U PRBC      Sepsis Present on admission  HIV - Stopped taking meds month ago.  Febrile neutropenia/ colitis. Possible AIDS, MAC, TB?  Switched to Zosyn for pseudomonal coverage   f/u cultures  ID consulted  AFB induction  Follow up infectious work up sent    Acute Hepatitis with elevated transaminases and elevated bilirubin  coagulopathy:   Cont vitamin K  Viral panel neg  trend liver function  GI following    HIV: based on Dr Rey :   prezcovix 800-150mg daily, tivicay 50mg daily, descovy 200-25mg daily    Anxiety/depression: bipolar?/ schizophrenia?  per roberto carlos:   gabapenin 300mg BID and 600 qhs  trazodone 50mg qhs  olanzapine 10mg qhs  sertraline 50mg qd  [id number] qd  psych eval, hold psych meds for now    History of opioid abuse, alcohol abuse  follows at [facility] has been prescribed suboxone for many years. Was filling prescriptions regularly.  Stopped going to clinic in march.   Per Nephstacy Cooley patient was found with several completely full boxes of suboxone, the most recent being from March 2023.   Raising suspicion for non compliance and drug use but urine drug screen did not have opiates on admission.  8/2 TID    Tachycardia   EKG with sinus tach, no ischemia   Minimal improvement with saline bolus   Added additional saline bolus at 1700.  Consideration for alcohol withdrawal? Patient is within 72 hour window of admission. And has tremors on exam. Discussed with Nephew Zen who endorses that he has been cleaning the patient's room for last 3 weeks because he has not been able to walk around and has not seen any alcohol.   Possibly secretly ingesting?  Will trial valium 10mg ivp and observe for potential response  cont tele  upgrade to stepdown  if no improvement after valium and 2nd liter will consult ICU    Hypokalemia  Replete IV     Morning cbc, bmp reviewed. Morning CBC, BMP ordered  DVT ppx:  Dispo: Acute. Upgrade to step-down.   Case Discussed with Psychiatry, ID, Hematology     Total time spent reviewing chart, images, labs as well as peforming history, physical exam, obtaining collateral history from nephew, [place] [facility] discussing with patient and family as well as psychiatry, ID , hematology attending, as well as discussing case with [facility] Hematology - 180 Minutes             53 year old male with with PMH HIV, Opioid abuse on Suboxone, Alcohol abuse presented with worsening jaundice, anorexia and confusion x 2 weeks.  Tachycardiac and Hypoxic in ER with Pancytopenia, Hyperbilirubinemia, Coagulopathy. Lactic Acidosis  CT Head (-). CT chest with bilateral tree in bed nodular opacities. CT Abdomen with poratl HTN, Splenomegaly, Ascite, ISAEL and right sided colitis.  Admitted and placed on Ertapenem for Colitis and transfused PRBC. Evaluated by ID; concern for AIDS associated disease, (HH, Advanced diseases, malignancy, etc) and work up ordered.  Evaluated by Hematology, BM recommended for HLH.      Pancytopenia - interval worsening. Ferritin increasing. Last CD4 150 (march 2023 as per clinic records)  Toxic Metabolic Encephalopathy - interval worsening. Normal Ammonia. on Lactulose. CTH (-)  Sepsis Present on admission. Still with fevers. negative cultures. Concern for disseminated infection  Respiratory Failure - Interval worsening  Acute Hepatitis with elevated transaminases and elevated bilirubin, coagulopathy:   Anxiety/depression: bipolar?/ schizophrenia?  History of opioid abuse, alcohol abuse    - SDU  - Supplemental O2, wean as tolerated  - Transfuse PRBC to maintain Hgb>7  - Transfuse platelets >20  - Soluble CD25, and NK Cell   - Antipseudomonal coverage given neutropenic fever  - Fluconazole for fungal infections  - VK for coagulopathy  - Lactulose, Rifaximin for possible hepatic encephalopathy, Thiamine for possible Wernicke's  - Bone Marrow biopsy once patient more stable  - Monitor LDH, Ferritin, Haptoglobin  - Follow up infectious work up  Check AFB urine culture   - Check GI PCR   - Check Stool Cx, O&P, stool AFB  - Follow mycobacterial blood culture  - Follow Parvovirus B12 PCR  - Follow EBV PCR  - Follow CMV PCR  - Follow histoplasma AG   - Toxoplasma IgG positive   - Follow Fungitell and AGM  - Syphilis screen neg    ICU consult  Guarded Prognosis    Discussed with family at bedside - full code for now  present during GOC conversation with palliative care

## 2023-05-04 NOTE — CONSULT NOTE ADULT - CONSULT REASON
Bone marrow biopsy
Acute Hypoxic Respiratory Failure
colitis/ gi bleed/ pancytopenia
AIDS
Pancytopenia
advance directives

## 2023-05-05 NOTE — DIETITIAN INITIAL EVALUATION ADULT - NSFNSGIIOFT_GEN_A_CORE
05-04-23 @ 07:01  -  05-05-23 @ 07:00  --------------------------------------------------------  OUT:    Rectal Tube (mL): 0 mL  Total OUT: 0 mL    Total NET: 0 mL

## 2023-05-05 NOTE — PROGRESS NOTE ADULT - ASSESSMENT
53 year old male with PMHx HIV, ETOH abuse (last drink 3 years ago), opiate abuse (last use 1 year ago, on Suboxone) presenting for evaluation of 2 weeks of worsening jaundice, decreased PO intake, weight loss, confusion. Admitted with medicine service with pancytopenia, neutropenic fever, ruling out severe sepsis present on admission of uncertain etiology, complicated by acute toxic-metabolic encephalopathy, acute liver failure with significantly elevated bili, INR, ferritin, and LDH levels, suspected GI bleed with component of acute blood loss anemia.     Transfer to medicine into private room and continue comfort care measures.  Palliative Care team is following patient and we appreciate their recommendations  Encephalopathy etiology uncertain, related to systemic infection ? HIV encephalitis, HLH. Continue holding Suboxone for now due to the risk for accumulation in the setting of acute liver failure.  Goals of care discussed extensively with patient's family, they do not want him intubated due to concerns of patient's comfort, however are unclear as they would want all life sustaining and saving measures, including CPR, to be performed. Continue with Palliative Care team recommendations who will assist us in ongoing goals of care conversations with patient's multiple family members.   On empiric antibiotics and antifungals with Zosyn and Diflucan. Sputum culture today revealed negative acid fast test.  Overall high clinical suspicion for HLH, meets multiple criteria (cytopenias, high ferritin, LDH, liver enzymes, coagulation profile, altered mental status) especially with underlying immunocompromised status, possibly infectious trigger and concern for occult malignancy. CD25 and NK activity sent and is pending.  Discussed extensively with Heme/Onc, was planned for urgent IR guided bone marrow biopsy today, which was deferred due to rule out TB status. However, upon review of imaging there is very low suspicion for and probability of disseminated TB.  Coordinated with Heme/Onc will start empiric treatment for HLH and possible malignancy with Dexamethasone 20mg IVPB daily in the interim. Treatment with Etoposide precluded by acute liver failure.

## 2023-05-05 NOTE — PROGRESS NOTE ADULT - SUBJECTIVE AND OBJECTIVE BOX
Patient is a 53y old  Male who presents with a chief complaint of colitis/ gi bleed/ pancytopenia (05 May 2023 10:42)    BRIEF HOSPITAL COURSE: 53 year old male with PMHx HIV, ETOH abuse (last drink 3 years ago), opiate abuse (last use 1 year ago, on Suboxone) presenting for evaluation of 2 weeks of worsening jaundice, decreased PO intake, weight loss, confusion. In the ER, found to be pancytopenic with occult blood positive in stool, transfused 2 units of PRBC. Patient was noted to be in acute liver failure with significantly elevated bili, INR, ferritin, and LDH levels. Ammonia within normal limits. Initial blood and urine cultures negative. Rapid HIV positive but viral load undetectable. CT chest/abd/pelv revealed bilateral tree-in-bud opacities c/w infectious vs inflammatory small airway disease, portal hypertension with moderate ascites, splenomegaly with splenic infarct, right sided colitis (portal colopathy vs infectious vs ischemic colitis), and retroperitoneal and pelvic lymphadenopathy. He was admitted to medicine service for neutropenic fever / severe sepsis workup with Heme/Onc consult. MICU consulted for acute hypoxic respiratory failure. On evaluation, patient encephalopathic, tachypneic with RR 40's on 100% NRB.     Events last 24 hours: Admitted into ICU. Patient is being followed by palliative care team. Discussed with family and decided patient should be placed on comfort care.    PAST MEDICAL & SURGICAL HISTORY:  Rotator cuff tear, right  Knee pain, right  Lumbar pain  Pt states that he is having an AXEL on 7/30  Nose pain  Alcohol abuse  Heroin abuse  HIV disease  S/P shoulder surgery  right 2015      Allergies    No Known Allergies    Intolerances    FAMILY HISTORY:  Family history of hypertension (Mother)        Review of Systems:  unable to answer due to medical condition      Medications:  HYDROmorphone  Injectable 2 milliGRAM(s) IV Push every 30 minutes PRN  HYDROmorphone  Injectable 4 milliGRAM(s) IV Push every 1 hour PRN  HYDROmorphone Infusion 2 mG/Hr IV Continuous <Continuous>  LORazepam   Injectable 1 milliGRAM(s) IV Push every 1 hour PRN        ICU Vital Signs Last 24 Hrs  T(C): 36.1 (05 May 2023 09:00), Max: 37.4 (04 May 2023 23:00)  T(F): 97 (05 May 2023 09:00), Max: 99.3 (04 May 2023 23:00)  HR: 93 (05 May 2023 09:00) (87 - 110)  BP: 86/53 (05 May 2023 04:00) (86/53 - 115/80)  BP(mean): 65 (05 May 2023 04:00) (65 - 93)  ABP: --  ABP(mean): --  RR: 29 (05 May 2023 09:00) (23 - 50)  SpO2: 93% (05 May 2023 09:00) (92% - 100%)    O2 Parameters below as of 05 May 2023 00:00  Patient On (Oxygen Delivery Method): BiPAP/CPAP    O2 Concentration (%): 100      ABG - ( 04 May 2023 21:09 )  pH, Arterial: 7.400 pH, Blood: x     /  pCO2: 34    /  pO2: 77    / HCO3: 21    / Base Excess: -3.7  /  SaO2: 98.2        I&O's Detail    04 May 2023 07:01  -  05 May 2023 07:00  --------------------------------------------------------  IN:    HYRDOmorphone: 35.5 mL    IV PiggyBack: 300 mL    Platelets - Single Donor: 225 mL  Total IN: 560.5 mL    OUT:    Indwelling Catheter - Urethral (mL): 1600 mL    Rectal Tube (mL): 0 mL  Total OUT: 1600 mL    Total NET: -1039.5 mL      05 May 2023 07:01  -  05 May 2023 14:40  --------------------------------------------------------  IN:    HYRDOmorphone: 10 mL  Total IN: 10 mL    OUT:  Total OUT: 0 mL    Total NET: 10 mL            LABS:                        9.1    0.29  )-----------( 23       ( 04 May 2023 17:35 )             27.2     05-04    142  |  108  |  61.2<H>  ----------------------------<  101<H>  3.3<L>   |  19.0<L>  |  1.08    Ca    7.1<L>      04 May 2023 17:35  Phos  4.8     05-04  Mg     2.1     05-04    TPro  4.1<L>  /  Alb  2.1<L>  /  TBili  24.0<H>  /  DBili  x   /  AST  138<H>  /  ALT  69<H>  /  AlkPhos  418<H>  05-04      CAPILLARY BLOOD GLUCOSE        PT/INR - ( 04 May 2023 17:35 )   PT: 19.4 sec;   INR: 1.66 ratio         PTT - ( 04 May 2023 17:35 )  PTT:42.2 sec    CULTURES:  Culture Results:   No growth to date. (05-04 @ 00:52)  Culture Results:   No growth to date. (05-04 @ 00:46)  Culture Results:   <10,000 CFU/mL Normal Urogenital Zoë (05-01 @ 19:03)  Culture Results:   No growth to date. (05-01 @ 14:55)  Culture Results:   No growth to date. (05-01 @ 14:40)    Culture - Acid Fast - Sputum w/Smear . (05.04.23 @ 11:30)   Specimen Source: .Sputum Sputum  Acid Fast Bacilli Smear:   No acid-fast bacilli seen by fluorochrome stain      Physical Examination:  General: Cachectic, ill appearing    Neuro: Follows some commands, Interactive, unable to assess A&O status due to extremis of his medical condition.    HEENT: Pupils equal, reactive to light.  Symmetric. Icteric.     PULM: Tachypneic. Clear to auscultation bilaterally    CVS: Tachycardic    ABD: Softly distended    EXT: No edema, nontender    SKIN: Juandiced    RADIOLOGY:    < from: Xray Chest 1 View- PORTABLE-Urgent (Xray Chest 1 View- PORTABLE-Urgent .) (05.04.23 @ 05:47) >  INTERPRETATION:  AP chest on May 4, 2023 at 5:30 AM. Patient is   desaturating.    Heart size normal.    There is a persistent infiltrate emanating off the right hilum similar to   May 1.    There may also be developing left base infiltrate at this time.    IMPRESSION: Persistent right lung infiltrate. Developing small left base   infiltrate.      < from: US Abdomen Upper Quadrant Right (05.02.23 @ 09:01) >  INTERPRETATION:  CLINICAL INFORMATION: Hyperbilirubinemia    COMPARISON: None available.    TECHNIQUE: Sonography of the right upper quadrant.    FINDINGS:  Liver: Enlarged (19.5 cm) with slightly heterogeneous echotexture and   mildly nodular contour possible cirrhotic change.  Bile ducts: Normal caliber. Common bile duct measures 2 mm.  Gallbladder: No gallstones. Diffuse nonspecific wall thickening likely   reactive in this setting.  Pancreas: Not adequately visualized.  Right kidney: 11.8 cm. No hydronephrosis.  Ascites: Small right upper quadrant ascites  IVC: Visualized portions are within normal limits.    IMPRESSION:  Hepatomegaly with  possible early cirrhotic change.  No biliary dilatation.  Mild ascites.      CRITICAL CARE TIME SPENT: ***

## 2023-05-05 NOTE — DIETITIAN INITIAL EVALUATION ADULT - PERTINENT MEDS FT
MEDICATIONS  (STANDING):  HYDROmorphone Infusion 2 mG/Hr (2 mL/Hr) IV Continuous <Continuous>    MEDICATIONS  (PRN):  HYDROmorphone  Injectable 2 milliGRAM(s) IV Push every 30 minutes PRN Respiratory distress  HYDROmorphone  Injectable 4 milliGRAM(s) IV Push every 1 hour PRN breakthrough resp distress  LORazepam   Injectable 1 milliGRAM(s) IV Push every 1 hour PRN agitation.anxiety

## 2023-05-05 NOTE — DIETITIAN INITIAL EVALUATION ADULT - SIGNS/SYMPTOMS
as evidenced by severe muscle loss and severe fat loss, suspect meeting <75% nutrient needs >1 month

## 2023-05-05 NOTE — DIETITIAN INITIAL EVALUATION ADULT - PROBLEM SELECTOR PLAN 3
possible hiv related,   close follow up on  daily labs   no old labs available for comaprison  2 units of prbc  hematology consult, heladio called

## 2023-05-05 NOTE — DIETITIAN INITIAL EVALUATION ADULT - PERTINENT LABORATORY DATA
05-04    142  |  108  |  61.2<H>  ----------------------------<  101<H>  3.3<L>   |  19.0<L>  |  1.08    Ca    7.1<L>      04 May 2023 17:35  Phos  4.8     05-04  Mg     2.1     05-04    TPro  4.1<L>  /  Alb  2.1<L>  /  TBili  24.0<H>  /  DBili  x   /  AST  138<H>  /  ALT  69<H>  /  AlkPhos  418<H>  05-04

## 2023-05-05 NOTE — PROGRESS NOTE ADULT - ASSESSMENT
54yo M w/ PMH of HIV, EtOH use disorder (last drink reportedly 3 yrs ago), OUD on suboxone (last use reportedly 1 yr ago) admitted with AMS in the setting of pancytopenia, sepsis, acute hepatitis with worsening respiratory failure, poor prognosis on comfort care      Acute Respiratory Failure  Family did not want intubation  On comfort care  Dilaudid infusion     Dyspnea  Dilaudid Infusion 2mg/hr with 2/4 mg PRN   Monitor PRN doses given  IF taking multiple doses then increase rate to 3mg/hr or as needed.  Consider Robinul 0.2mg IVP q6hr PRN for secretions    Pancytopenia  NO further blood work    Opioid Use disorder  Monitor for pain signs of agitation.  Dilaudid infusion as ordered  Ativan 1mg IVP PRN    Encounter for Palliative Care  Patient now on comfort care  Close monitoring for symptoms- medicate as ordered.  Psychosocial support

## 2023-05-05 NOTE — PROGRESS NOTE ADULT - SUBJECTIVE AND OBJECTIVE BOX
CC:  Follow up GOC , Symptoms    OVERNIGHT EVENTS:  events noted  patient now on comfort care    Present Symptoms:   Dyspnea:  No    Nausea/Vomiting:  No  Anxiety:  No  Depression:   Unable  Fatigue:  Yes     Loss of appetite:   NA   Constipation: No    Pain: N  No Signs            Location            Duration            Character            Severity            Factors            Effect    Pain AD Score:  [email or web address] (press ctrl + left click to view)    Review of Systems: Reviewed as above  Further ROS unable to  obtain due to poor mentation historian      MEDICATIONS  (STANDING):  HYDROmorphone Infusion 2 mG/Hr (2 mL/Hr) IV Continuous <Continuous>    MEDICATIONS  (PRN):  HYDROmorphone  Injectable 4 milliGRAM(s) IV Push every 1 hour PRN breakthrough resp distress  HYDROmorphone  Injectable 2 milliGRAM(s) IV Push every 30 minutes PRN Respiratory distress  LORazepam   Injectable 1 milliGRAM(s) IV Push every 1 hour PRN agitation.anxiety      PHYSICAL EXAM:    Vital Signs Last 24 Hrs  T(C): 36.1 (05 May 2023 09:00), Max: 37.4 (04 May 2023 23:00)  T(F): 97 (05 May 2023 09:00), Max: 99.3 (04 May 2023 23:00)  HR: 93 (05 May 2023 09:00) (78 - 110)  BP: 86/53 (05 May 2023 04:00) (86/53 - 115/80)  BP(mean): 65 (05 May 2023 04:00) (65 - 93)  RR: 29 (05 May 2023 09:00) (23 - 50)  SpO2: 93% (05 May 2023 09:00) (92% - 100%)    Parameters below as of 05 May 2023 00:00  Patient On (Oxygen Delivery Method): BiPAP/CPAP    O2 Concentration (%): 100    Karnofsky:10  %  General:  M thin frail jaundice NAD       HEENT:  NCAT    Lungs: comfortable  non labored  CV:  RR  GI:  soft NTND  MSK: weak bedbound  Skin:  warm/dry  Neuro  obtunded    LABS:                          9.1    0.29  )-----------( 23       ( 04 May 2023 17:35 )             27.2     05-04    142  |  108  |  61.2<H>  ----------------------------<  101<H>  3.3<L>   |  19.0<L>  |  1.08    Ca    7.1<L>      04 May 2023 17:35  Phos  4.8     05-04  Mg     2.1     05-04    TPro  4.1<L>  /  Alb  2.1<L>  /  TBili  24.0<H>  /  DBili  x   /  AST  138<H>  /  ALT  69<H>  /  AlkPhos  418<H>  05-04    PT/INR - ( 04 May 2023 17:35 )   PT: 19.4 sec;   INR: 1.66 ratio         PTT - ( 04 May 2023 17:35 )  PTT:42.2 sec    I&O's Summary    04 May 2023 07:01  -  05 May 2023 07:00  --------------------------------------------------------  IN: 560.5 mL / OUT: 1600 mL / NET: -1039.5 mL    05 May 2023 07:01  -  05 May 2023 10:49  --------------------------------------------------------  IN: 10 mL / OUT: 0 mL / NET: 10 mL    ADVANCE DIRECTIVE PREFERENCES    DNR/I  and comfort measures

## 2023-05-05 NOTE — DIETITIAN INITIAL EVALUATION ADULT - ETIOLOGY
Airway patent, Nasal mucosa clear. Mouth with normal mucosa.
related to inability to meet sufficient protein-energy in setting of multiple comorbidities

## 2023-05-05 NOTE — CHART NOTE - NSCHARTNOTEFT_GEN_A_CORE
INFECTIOUS DISEASE    Chart reviewed - events noted.   Patient now on comfort measures actively followed by Palliative.   ID will sign off.

## 2023-05-05 NOTE — DIETITIAN INITIAL EVALUATION ADULT - OTHER INFO
53 year old male with PMHx HIV, ETOH abuse (last drink 3 years ago), opiate abuse (last use 1 year ago, on Suboxone) presenting for evaluation of 2 weeks of worsening jaundice, decreased PO intake, weight loss, confusion. In the ER, found to be pancytopenic with occult blood positive in stool, transfused 2 units of PRBC. Patient was noted to be in acute liver failure with significantly elevated bili, INR, ferritin, and LDH levels. Ammonia within normal limits. Initial blood and urine cultures negative. Rapid HIV positive but viral load undetectable. CT chest/abd/pelv revealed bilateral tree-in-bud opacities c/w infectious vs inflammatory small airway disease, portal hypertension with moderate ascites, splenomegaly with splenic infarct, right sided colitis (portal colopathy vs infectious vs ischemic colitis), and retroperitoneal and pelvic lymphadenopathy. He was admitted to medicine service for neutropenic fever / severe sepsis workup with Heme/Onc consult. MICU consulted for acute hypoxic respiratory failure.     Pt with AMS, remains NPO. GOC ongoing, aware comfort measures initiated. RD remains available.

## 2023-05-05 NOTE — PROGRESS NOTE ADULT - SUBJECTIVE AND OBJECTIVE BOX
CC: colitis/ gi bleed/ pancytopenia (05 May 2023 14:40)    HPI:  pt. is a 53 year old male with PMHx , hiv , ETOH abuse (last drink 3 years ago), opiate abuse (last use 1 year ago, has been on suboxone) presenting for evaluation of 2 weeks of worsening jaundice, decreased PO, weight loss, confusion. pt's sister at bedside providing history as patient is AOx1 (self) at this time. Earlier pt's Nephew stated that he sees patient daily, was started on a number of medications 3 weeks ago which he thinks were psychiatric medications, then 2 weeks ago began to decompensate. Over last 3-4 days patient was no longer oriented to time or place. Patient lives alone, nephew went to see his apartment today and noted multiple packets of suboxone on the ground, is concerned patient is taking too much. No recent fevers, vomiting, abd pain. As per pt's sister pt. not taking his meds, hiv meds , pt. cannot verify if using his hiv meds. pt. is pancytopenic in the ER, occult blood positive in stool, Hb 6.7 , getting 2 units of prbc.  (01 May 2023 21:34)    INTERVAL HPI/OVERNIGHT EVENTS:    Vital Signs Last 24 Hrs  T(C): 36.1 (05 May 2023 09:00), Max: 37.4 (04 May 2023 23:00)  T(F): 97 (05 May 2023 09:00), Max: 99.3 (04 May 2023 23:00)  HR: 93 (05 May 2023 09:00) (88 - 110)  BP: 86/53 (05 May 2023 04:00) (86/53 - 115/80)  BP(mean): 65 (05 May 2023 04:00) (65 - 93)  RR: 29 (05 May 2023 09:00) (23 - 50)  SpO2: 93% (05 May 2023 09:00) (92% - 100%)    Parameters below as of 05 May 2023 00:00  Patient On (Oxygen Delivery Method): BiPAP/CPAP    O2 Concentration (%): 100        I&O's Detail    04 May 2023 07:01  -  05 May 2023 07:00  --------------------------------------------------------  IN:    HYRDOmorphone: 35.5 mL    IV PiggyBack: 300 mL    Platelets - Single Donor: 225 mL  Total IN: 560.5 mL    OUT:    Indwelling Catheter - Urethral (mL): 1600 mL    Rectal Tube (mL): 0 mL  Total OUT: 1600 mL    Total NET: -1039.5 mL      05 May 2023 07:01  -  05 May 2023 15:30  --------------------------------------------------------  IN:    HYRDOmorphone: 10 mL  Total IN: 10 mL    OUT:  Total OUT: 0 mL    Total NET: 10 mL                                    9.1    0.29  )-----------( 23       ( 04 May 2023 17:35 )             27.2     04 May 2023 17:35    142    |  108    |  61.2   ----------------------------<  101    3.3     |  19.0   |  1.08     Ca    7.1        04 May 2023 17:35  Phos  4.8       04 May 2023 17:35  Mg     2.1       04 May 2023 17:35    TPro  4.1    /  Alb  2.1    /  TBili  24.0   /  DBili  x      /  AST  138    /  ALT  69     /  AlkPhos  418    04 May 2023 17:35    PT/INR - ( 04 May 2023 17:35 )   PT: 19.4 sec;   INR: 1.66 ratio         PTT - ( 04 May 2023 17:35 )  PTT:42.2 sec  CAPILLARY BLOOD GLUCOSE        LIVER FUNCTIONS - ( 04 May 2023 17:35 )  Alb: 2.1 g/dL / Pro: 4.1 g/dL / ALK PHOS: 418 U/L / ALT: 69 U/L / AST: 138 U/L / GGT: x               MEDICATIONS  (STANDING):  HYDROmorphone Infusion 2 mG/Hr (2 mL/Hr) IV Continuous <Continuous>    MEDICATIONS  (PRN):  HYDROmorphone  Injectable 4 milliGRAM(s) IV Push every 1 hour PRN breakthrough resp distress  HYDROmorphone  Injectable 2 milliGRAM(s) IV Push every 30 minutes PRN Respiratory distress  LORazepam   Injectable 1 milliGRAM(s) IV Push every 1 hour PRN agitation.anxiety      RADIOLOGY & ADDITIONAL TESTS: CC: colitis/ gi bleed/ pancytopenia (05 May 2023 14:40)    HPI:  pt. is a 53 year old male with PMHx , hiv , ETOH abuse (last drink 3 years ago), opiate abuse (last use 1 year ago, has been on suboxone) presenting for evaluation of 2 weeks of worsening jaundice, decreased PO, weight loss, confusion. pt's sister at bedside providing history as patient is AOx1 (self) at this time. Earlier pt's Nephew stated that he sees patient daily, was started on a number of medications 3 weeks ago which he thinks were psychiatric medications, then 2 weeks ago began to decompensate. Over last 3-4 days patient was no longer oriented to time or place. Patient lives alone, nephew went to see his apartment today and noted multiple packets of suboxone on the ground, is concerned patient is taking too much. No recent fevers, vomiting, abd pain. As per pt's sister pt. not taking his meds, hiv meds , pt. cannot verify if using his hiv meds. pt. is pancytopenic in the ER, occult blood positive in stool, Hb 6.7 , getting 2 units of prbc.  (01 May 2023 21:34)    INTERVAL HPI/OVERNIGHT EVENTS:  transitioned to comfort care    Vital Signs Last 24 Hrs  T(C): 36.1 (05 May 2023 09:00), Max: 37.4 (04 May 2023 23:00)  T(F): 97 (05 May 2023 09:00), Max: 99.3 (04 May 2023 23:00)  HR: 93 (05 May 2023 09:00) (88 - 110)  BP: 86/53 (05 May 2023 04:00) (86/53 - 115/80)  BP(mean): 65 (05 May 2023 04:00) (65 - 93)  RR: 29 (05 May 2023 09:00) (23 - 50)  SpO2: 93% (05 May 2023 09:00) (92% - 100%)    Parameters below as of 05 May 2023 00:00  Patient On (Oxygen Delivery Method): BiPAP/CPAP    O2 Concentration (%): 100    I&O's Detail    04 May 2023 07:01  -  05 May 2023 07:00  --------------------------------------------------------  IN:    HYRDOmorphone: 35.5 mL    IV PiggyBack: 300 mL    Platelets - Single Donor: 225 mL  Total IN: 560.5 mL    OUT:    Indwelling Catheter - Urethral (mL): 1600 mL    Rectal Tube (mL): 0 mL  Total OUT: 1600 mL    Total NET: -1039.5 mL    05 May 2023 07:01  -  05 May 2023 15:30  --------------------------------------------------------  IN:    HYRDOmorphone: 10 mL  Total IN: 10 mL    OUT:  Total OUT: 0 mL    Total NET: 10 mL                        9.1    0.29  )-----------( 23       ( 04 May 2023 17:35 )             27.2     04 May 2023 17:35    142    |  108    |  61.2   ----------------------------<  101    3.3     |  19.0   |  1.08     Ca    7.1        04 May 2023 17:35  Phos  4.8       04 May 2023 17:35  Mg     2.1       04 May 2023 17:35    TPro  4.1    /  Alb  2.1    /  TBili  24.0   /  DBili  x      /  AST  138    /  ALT  69     /  AlkPhos  418    04 May 2023 17:35    PT/INR - ( 04 May 2023 17:35 )   PT: 19.4 sec;   INR: 1.66 ratio         PTT - ( 04 May 2023 17:35 )  PTT:42.2 sec  CAPILLARY BLOOD GLUCOSE        LIVER FUNCTIONS - ( 04 May 2023 17:35 )  Alb: 2.1 g/dL / Pro: 4.1 g/dL / ALK PHOS: 418 U/L / ALT: 69 U/L / AST: 138 U/L / GGT: x               MEDICATIONS  (STANDING):  HYDROmorphone Infusion 2 mG/Hr (2 mL/Hr) IV Continuous <Continuous>    MEDICATIONS  (PRN):  HYDROmorphone  Injectable 4 milliGRAM(s) IV Push every 1 hour PRN breakthrough resp distress  HYDROmorphone  Injectable 2 milliGRAM(s) IV Push every 30 minutes PRN Respiratory distress  LORazepam   Injectable 1 milliGRAM(s) IV Push every 1 hour PRN agitation.anxiety      RADIOLOGY & ADDITIONAL TESTS:

## 2023-05-05 NOTE — PROGRESS NOTE ADULT - NSPROGADDITIONALINFOA_GEN_ALL_CORE
Time spent with review of chart documents, labs, imaging. Direct patient assessment,  formulation of care plan. Discussion with  Interdisciplinary  team
Time spent with review of chart documents, labs, imaging. Direct patient assessment,  formulation of care plan. Discussion with  Interdisciplinary  team  KEVIN Black. family  including ACP  _50____minutes

## 2023-05-05 NOTE — PROGRESS NOTE ADULT - ASSESSMENT
53 year old male with PMHx, HIV, ETOH abuse (last drink 3 years ago), opiate abuse (last use 1 year ago, has been on suboxone) presenting for evaluation of 2 weeks of worsening jaundice, decreased PO, weight loss, confusion. In the ED patient was pancytopenic in the, occult blood positive in stool, Hb 6.7 , received 2 units of prbc.    Patient admitted to medicine initially. Developed febrile neutropenia and demonstrated abnormal LFT's. Heme/onc saw patient on day 2 of admission and concern for HLH was raised. Patient was recommended for urgent bone marrow biopsy. started on zosyn and cultures obtained.    ICU consulted on day 4 of admission for acute hypoxic respiratory failure amidst other issues including liver failure, pancytopenia, sepsis, delirium.    Palliative had been following from day 2 of admission. Patient today had change in goals to aggressively pursue comfort over treaments for possible HLH for which he has a grave prognosis. Patient at this time be transferred to medicine service.    #pancytopenia with possible HLH  #acute hypoxemic respiratory failure  - continue with dilaudid drip  - PRN diluadid as well for SOB/air hunger/pain  - PRN robinul  - palliative care following  - transfer to medical floor when bed available.

## 2023-05-06 NOTE — PROGRESS NOTE ADULT - SUBJECTIVE AND OBJECTIVE BOX
Patient is a 53y old  Male who presents with a chief complaint of colitis/ gi bleed/ pancytopenia (05 May 2023 15:29)      Patient seen and examined at bedside. No overnight events reported.     ALLERGIES:  No Known Allergies    MEDICATIONS  (STANDING):  HYDROmorphone Infusion 2 mG/Hr (2 mL/Hr) IV Continuous <Continuous>    MEDICATIONS  (PRN):  HYDROmorphone  Injectable 4 milliGRAM(s) IV Push every 1 hour PRN breakthrough resp distress  HYDROmorphone  Injectable 2 milliGRAM(s) IV Push every 30 minutes PRN Respiratory distress  LORazepam   Injectable 1 milliGRAM(s) IV Push every 1 hour PRN agitation.anxiety    Vital Signs Last 24 Hrs  T(F): 96.4 (05 May 2023 15:00), Max: 96.6 (05 May 2023 11:00)  HR: 97 (05 May 2023 15:00) (94 - 98)  BP: --  RR: 24 (05 May 2023 15:00) (24 - 34)  SpO2: 91% (05 May 2023 15:00) (91% - 98%)  I&O's Summary    05 May 2023 07:01  -  06 May 2023 07:00  --------------------------------------------------------  IN: 10 mL / OUT: 0 mL / NET: 10 mL    06 May 2023 07:01  -  06 May 2023 10:53  --------------------------------------------------------  IN: 15 mL / OUT: 0 mL / NET: 15 mL        PHYSICAL EXAM:  General: sleeping medicated  Lungs:CTA bilateral    Cardio: RRR, S1/S2, No murmur  Abdomen: Soft, Nontender, Nondistended; Bowel sounds present  Extremities: No calf tenderness, No pitting edema  SKIN jaundice   rectal tube and york in place       labs reviewed

## 2023-05-06 NOTE — PROGRESS NOTE ADULT - ASSESSMENT
53 year old male with PMHx, HIV, ETOH abuse (last drink 3 years ago), opiate abuse (last use 1 year ago, has been on suboxone) presenting for evaluation of 2 weeks of worsening jaundice, decreased PO, weight loss, confusion. In the ED patient was pancytopenic in the, occult blood positive in stool, Hb 6.7 , received 2 units of prbc.    Patient admitted to medicine initially. Developed febrile neutropenia and demonstrated abnormal LFT's. Heme/onc saw patient on day 2 of admission and concern for HLH was raised. Patient was recommended for urgent bone marrow biopsy. started on zosyn and cultures obtained.    ICU consulted on day 4 of admission for acute hypoxic respiratory failure amidst other issues including liver failure, pancytopenia, sepsis, delirium.    Palliative had been following from day 2 of admission. Patient today had change in goals to aggressively pursue comfort over treaments for possible HLH for which he has a grave prognosis. Patient on comfort care  transferred to medicine service 5/5     1-pancytopenia with possible HLH  2-acute hypoxemic respiratory failure  3- HIV infection   4- Severe protein taylor malnutrution   - continue with dilaudid drip  - PRN diluadid as well for SOB/air hunger/pain  - PRN robinul  - palliative care on board   -

## 2023-05-07 NOTE — PROGRESS NOTE ADULT - REASON FOR ADMISSION
colitis/ gi bleed/ pancytopenia

## 2023-05-07 NOTE — PROGRESS NOTE ADULT - PROVIDER SPECIALTY LIST ADULT
Heme/Onc
Infectious Disease
Hospitalist
Hospitalist
Infectious Disease
Palliative Care
Internal Medicine
Palliative Care
Hospitalist
Hospitalist
MICU
Gastroenterology
Hospitalist

## 2023-05-07 NOTE — DISCHARGE NOTE FOR THE EXPIRED PATIENT - OTHER SIGNIFICANT FINDINGS
Pulses no pulses palpated  Heart no heart beat auscultated  Lungs no breath sounds  Eyes no corneal reflex

## 2023-05-07 NOTE — PROGRESS NOTE ADULT - NUTRITIONAL ASSESSMENT
This patient has been assessed with a concern for Malnutrition and has been determined to have a diagnosis/diagnoses of Severe protein-calorie malnutrition.    This patient is being managed with:   Diet Easy to Chew-  Entered: May  5 2023 12:00PM  

## 2023-05-07 NOTE — DISCHARGE NOTE FOR THE EXPIRED PATIENT - HOSPITAL COURSE
53 year old male with PMHx, HIV, ETOH abuse (last drink 3 years ago), opiate abuse (last use 1 year ago, has been on suboxone) presenting for evaluation of 2 weeks of worsening jaundice, decreased PO, weight loss, confusion. In the ED patient was pancytopenic in the, occult blood positive in stool, Hb 6.7 , received 2 units of prbc.  Patient admitted to medicine initially. Developed febrile neutropenia and demonstrated abnormal LFT's. Heme/onc saw patient on day 2 of admission and concern for HLH was raised. Patient was recommended for urgent bone marrow biopsy. started on zosyn and cultures obtained.  ICU consulted on day 4 of admission for acute hypoxic respiratory failure amidst other issues including liver failure, pancytopenia, sepsis, delirium.  Palliative had been following from day 2 of admission. Patient today had change in goals to aggressively pursue comfort over treaments for possible HLH for which he has a grave prognosis. Patient on comfort care  transferred to medicine service 5/5      53 year old male with PMHx, HIV, ETOH abuse (last drink 3 years ago), opiate abuse (last use 1 year ago, has been on suboxone) presenting for evaluation of 2 weeks of worsening jaundice, decreased PO, weight loss, confusion. In the ED patient was pancytopenic in the, occult blood positive in stool, Hb 6.7 , received 2 units of prbc.  Patient admitted to medicine initially. Developed febrile neutropenia and demonstrated abnormal LFT's. Heme/onc saw patient on day 2 of admission and concern for HLH was raised. Patient was recommended for urgent bone marrow biopsy. started on zosyn and cultures obtained.  ICU consulted on day 4 of admission for acute hypoxic respiratory failure amidst other issues including liver failure, pancytopenia, sepsis, delirium.  Palliative had been following from day 2 of admission. Patient today had change in goals to aggressively pursue comfort over treatments for possible HLH for which he has a grave prognosis. Patient on comfort care  transferred to medicine service  . He was on comfort care Family did not want intubation, started dilaudid  Infusion 2mg/hr with 2/4 mg PRN   He  on  pm

## 2023-05-07 NOTE — PROGRESS NOTE ADULT - SUBJECTIVE AND OBJECTIVE BOX
Patient is a 53y old  Male who presents with a chief complaint of colitis/ gi bleed/ pancytopenia       Patient is unresponsive on comfort care   sedated   looks comfortable ,jaundice       ALLERGIES:  No Known Allergies    MEDICATIONS  (STANDING):  HYDROmorphone Infusion 2 mG/Hr (2 mL/Hr) IV Continuous <Continuous>    MEDICATIONS  (PRN):  HYDROmorphone  Injectable 4 milliGRAM(s) IV Push every 1 hour PRN breakthrough resp distress  HYDROmorphone  Injectable 2 milliGRAM(s) IV Push every 30 minutes PRN Respiratory distress  LORazepam   Injectable 1 milliGRAM(s) IV Push every 1 hour PRN agitation.anxiety    Vital SiIN: 15 mL / OUT: 0 mL / NET: 15 mL    Vital Signs Last 24 Hrs  T(C): --  T(F): --  HR: --  BP: --  BP(mean): --  RR: --  SpO2: --    Parameters below as of 06 May 2023 21:30  Patient On (Oxygen Delivery Method): nasal cannula,2L        PHYSICAL EXAM:  General: sleeping  Lungs:CTA bilateral    Cardio: RRR, S1/S2, No murmur  Abdomen: Soft, Nontender, Nondistended; Bowel sounds present  Extremities: No calf tenderness, No pitting edema  SKIN jaundice   rectal tube and york in place       labs reviewed

## 2023-05-09 LAB
CULTURE RESULTS: SIGNIFICANT CHANGE UP
CULTURE RESULTS: SIGNIFICANT CHANGE UP
SPECIMEN SOURCE: SIGNIFICANT CHANGE UP
SPECIMEN SOURCE: SIGNIFICANT CHANGE UP

## 2023-05-11 LAB — GALACTOMANNAN AG SERPL-ACNC: 0.05 INDEX — SIGNIFICANT CHANGE UP (ref 0–0.49)

## 2023-05-19 NOTE — CHART NOTE - NSCHARTNOTEFT_GEN_A_CORE
Palliative care social work note.    Bereavement call made to patients sister Clive. Support and resources offered.

## 2023-06-21 LAB
CULTURE RESULTS: SIGNIFICANT CHANGE UP
SPECIMEN SOURCE: SIGNIFICANT CHANGE UP

## 2023-12-29 NOTE — ED ADULT NURSE NOTE - HOW OFTEN DO YOU HAVE SIX OR MORE DRINKS ON ONE OCCASION?
"Discharge Summary    CHIEF COMPLAINT ON ADMISSION  Chief Complaint   Patient presents with    Chest Pain     Started this am at 0530, \"tightness across chest\"    Shortness of Breath       Reason for Admission  Chest Pain, Shortness of Breath     Admission Date  12/28/2023    CODE STATUS  DNAR/DNI    HPI & HOSPITAL COURSE  This is a 67 y.o. female here with persistent chest pain which started about 5:30 in the AM on 12/28/2023.  History of peptic ulcer disease, rheumatoid arthritis, asthma, dyslipidemia.    On admission, troponin levels were negative.  EKG showed no signs of acute ischemia. She received nitroglycerin which alleviated her pain.    Chest x-ray showed no evidence of acute cardiopulmonary disease.  Echocardiogram from 12/28/2023 showed LVEF of about 68% with normal left and right ventricular systolic function.    Nuclear medicine stress test from today shows no evidence of significant jeopardized viable myocardium or prior myocardial infarction.  Patient has had no further episodes of chest pain.  She is afebrile and hemodynamically stable.  Labs unremarkable.    Patient feels well and is eager to go home.  She has been started on aspirin and omeprazole.  She has been advised to follow-up with her primary care physician within the next few days.      Therefore, she is discharged in fair and stable condition to home with close outpatient follow-up.      Discharge Date  12/29/2023    FOLLOW UP ITEMS POST DISCHARGE  PCP in 2-3 days    DISCHARGE DIAGNOSES  Principal Problem (Resolved):    Chest pain (POA: Yes)  Active Problems:    Rheumatoid arthritis involving multiple sites with positive rheumatoid factor (HCC) (POA: Unknown)    Peptic ulcer (POA: Unknown)    Dyslipidemia (POA: Unknown)    History of migraine (POA: Unknown)    Asthma (POA: Unknown)    History of COVID-19 (POA: Unknown)    DNR (do not resuscitate) (POA: Unknown)      FOLLOW UP  No future appointments.  No follow-up provider " specified.    MEDICATIONS ON DISCHARGE     Medication List        START taking these medications        Instructions   aspirin 81 MG EC tablet   Take 1 Tablet by mouth every day.  Dose: 81 mg     omeprazole 20 MG delayed-release capsule  Commonly known as: PriLOSEC   Take 1 Capsule by mouth every day.  Dose: 20 mg            CONTINUE taking these medications        Instructions   BIOTIN PO   Take 1 Tablet by mouth every day.  Dose: 1 Tablet     Etanercept 50 MG/ML Sosy   Inject 50 mg under the skin every Sunday.  Dose: 50 mg     fluticasone 220 MCG/ACT Aero  Commonly known as: Flovent HFA   Inhale 1-2 Puffs 2 times a day as needed. Indications: Asthma  Dose: 1-2 Puff     folic acid 1 MG Tabs  Commonly known as: Folvite   Take 2 mg by mouth every day.  Dose: 2 mg     hydroxychloroquine 200 MG Tabs  Commonly known as: Plaquenil   Take 300 mg by mouth every day.  Dose: 300 mg     Imitrex 20 MG/ACT nasal spray  Generic drug: sumatriptan   Administer 1 Spray into affected nostril(S) as needed for Migraine.  Dose: 1 Spray     leflunomide 10 MG Tabs  Commonly known as: Arava   Take 10 mg by mouth every day.  Dose: 10 mg     PROBIOTIC PO   Take 1 Tablet by mouth every day.  Dose: 1 Tablet     VITAMIN C PO   Take 1 Tablet by mouth every day.  Dose: 1 Tablet     VITAMIN D PO   Take 1 Tablet by mouth every day.  Dose: 1 Tablet     * WOMENS 50+ MULTI VITAMIN PO   Take 1 Tablet by mouth every day.  Dose: 1 Tablet     * HAIR SKIN & NAILS PO   Take 1 Tablet by mouth every day.  Dose: 1 Tablet           * This list has 2 medication(s) that are the same as other medications prescribed for you. Read the directions carefully, and ask your doctor or other care provider to review them with you.                  Allergies  Allergies   Allergen Reactions    Cefuroxime     Codeine     Darvon [Propoxyphene]     Demerol Hcl [Meperidine]     Erythromycin     Hydrocodone-Acetaminophen     Oxycodone-Aspirin     Penicillin G     Sulfa Drugs      Sulfamethoxazole W-Trimethoprim Anaphylaxis       DIET  Orders Placed This Encounter   Procedures    Diet Order Diet: Cardiac; Miscellaneous modifications: (optional): No Decaf, No Caffeine(for test)     Standing Status:   Standing     Number of Occurrences:   1     Order Specific Question:   Diet:     Answer:   Cardiac [6]     Order Specific Question:   Miscellaneous modifications: (optional)     Answer:   No Decaf, No Caffeine(for test) [11]       ACTIVITY  As tolerated.      CONSULTATIONS  None    PROCEDURES  NM stress test    LABORATORY  Lab Results   Component Value Date    SODIUM 140 12/29/2023    POTASSIUM 3.8 12/29/2023    CHLORIDE 106 12/29/2023    CO2 20 12/29/2023    GLUCOSE 97 12/29/2023    BUN 16 12/29/2023    CREATININE 0.70 12/29/2023        Lab Results   Component Value Date    WBC 6.4 12/29/2023    HEMOGLOBIN 13.4 12/29/2023    HEMATOCRIT 40.8 12/29/2023    PLATELETCT 269 12/29/2023        Total time of the discharge process exceeds 35 minutes.   Four or more times a week

## 2024-05-17 NOTE — ED ADULT TRIAGE NOTE - HEIGHT IN CM
Render Risk Assessment In Note?: no Detail Level: Simple Comment: Improving. Pt experiencing irritation with metrocream, recommended mixing 50/50 with elidel, to apply BID. 177.8 Comment: Improving. Pt denies SE, will continue spironolactone 150mg qd. Comment: Questionable drug eruption vs vasculitis
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
## 2024-12-05 NOTE — ED ADULT TRIAGE NOTE - WEIGHT IN KG
Instructions: This plan will send the code FBSE to the PM system.  DO NOT or CHANGE the price.
Detail Level: Simple
Price (Do Not Change): 0.00
70.3

## 2025-01-08 NOTE — ED ADULT NURSE REASSESSMENT NOTE - NSFALLRSKASSESSDT_ED_ALL_ED
Lab Results   Component Value Date    HGBA1C 7.4 (H) 11/30/2024   - Follows Endo  - Wears Dexcom which reports his A1C is ar  - BG in am around 120         
01-May-2023 15:52
01-May-2023 20:45
